# Patient Record
Sex: MALE | Race: WHITE | Employment: FULL TIME | URBAN - NONMETROPOLITAN AREA
[De-identification: names, ages, dates, MRNs, and addresses within clinical notes are randomized per-mention and may not be internally consistent; named-entity substitution may affect disease eponyms.]

---

## 2019-02-08 ENCOUNTER — APPOINTMENT (OUTPATIENT)
Dept: CT IMAGING | Age: 24
End: 2019-02-08
Payer: MEDICAID

## 2019-02-08 ENCOUNTER — ANESTHESIA EVENT (OUTPATIENT)
Dept: OPERATING ROOM | Age: 24
End: 2019-02-08
Payer: MEDICAID

## 2019-02-08 ENCOUNTER — ANESTHESIA (OUTPATIENT)
Dept: OPERATING ROOM | Age: 24
End: 2019-02-08
Payer: MEDICAID

## 2019-02-08 ENCOUNTER — HOSPITAL ENCOUNTER (OUTPATIENT)
Age: 24
Setting detail: OBSERVATION
Discharge: HOME | End: 2019-02-08
Attending: EMERGENCY MEDICINE | Admitting: SURGERY
Payer: MEDICAID

## 2019-02-08 VITALS
RESPIRATION RATE: 5 BRPM | TEMPERATURE: 96.6 F | DIASTOLIC BLOOD PRESSURE: 57 MMHG | SYSTOLIC BLOOD PRESSURE: 102 MMHG | OXYGEN SATURATION: 100 %

## 2019-02-08 VITALS
RESPIRATION RATE: 16 BRPM | HEIGHT: 68 IN | SYSTOLIC BLOOD PRESSURE: 99 MMHG | HEART RATE: 63 BPM | DIASTOLIC BLOOD PRESSURE: 57 MMHG | WEIGHT: 215 LBS | BODY MASS INDEX: 32.58 KG/M2 | OXYGEN SATURATION: 96 % | TEMPERATURE: 97.6 F

## 2019-02-08 DIAGNOSIS — K35.20 ACUTE APPENDICITIS WITH GENERALIZED PERITONITIS, WITHOUT ABSCESS, UNSPECIFIED WHETHER GANGRENE PRESENT, UNSPECIFIED WHETHER PERFORATION PRESENT: Primary | ICD-10-CM

## 2019-02-08 PROBLEM — K37 APPENDICITIS: Status: ACTIVE | Noted: 2019-02-08

## 2019-02-08 PROBLEM — K35.80 ACUTE APPENDICITIS: Status: ACTIVE | Noted: 2019-02-08

## 2019-02-08 LAB
ALBUMIN SERPL-MCNC: 4.7 G/DL (ref 3.5–5.2)
ALP BLD-CCNC: 90 U/L (ref 40–130)
ALT SERPL-CCNC: 22 U/L (ref 5–41)
ANION GAP SERPL CALCULATED.3IONS-SCNC: 12 MMOL/L (ref 7–19)
AST SERPL-CCNC: 22 U/L (ref 5–40)
ATYPICAL LYMPHOCYTE RELATIVE PERCENT: 5 % (ref 0–8)
BASOPHILS ABSOLUTE: 0 K/UL (ref 0–0.2)
BASOPHILS MANUAL: 0 %
BASOPHILS RELATIVE PERCENT: 0 % (ref 0–1)
BILIRUB SERPL-MCNC: 0.4 MG/DL (ref 0.2–1.2)
BILIRUBIN URINE: NEGATIVE
BLOOD, URINE: NEGATIVE
BUN BLDV-MCNC: 10 MG/DL (ref 6–20)
CALCIUM SERPL-MCNC: 9.2 MG/DL (ref 8.6–10)
CHLORIDE BLD-SCNC: 103 MMOL/L (ref 98–111)
CLARITY: ABNORMAL
CO2: 27 MMOL/L (ref 22–29)
COLOR: YELLOW
CREAT SERPL-MCNC: 0.9 MG/DL (ref 0.5–1.2)
EOSINOPHILS ABSOLUTE: 0.18 K/UL (ref 0–0.6)
EOSINOPHILS RELATIVE PERCENT: 1 % (ref 0–5)
GFR NON-AFRICAN AMERICAN: >60
GLUCOSE BLD-MCNC: 92 MG/DL (ref 74–109)
GLUCOSE URINE: NEGATIVE MG/DL
HCT VFR BLD CALC: 48.4 % (ref 42–52)
HEMOGLOBIN: 16.4 G/DL (ref 14–18)
KETONES, URINE: NEGATIVE MG/DL
LEUKOCYTE ESTERASE, URINE: NEGATIVE
LIPASE: 20 U/L (ref 13–60)
LYMPHOCYTES ABSOLUTE: 7.2 K/UL (ref 1.1–4.5)
LYMPHOCYTES RELATIVE PERCENT: 36 % (ref 20–40)
MCH RBC QN AUTO: 29.6 PG (ref 27–31)
MCHC RBC AUTO-ENTMCNC: 33.9 G/DL (ref 33–37)
MCV RBC AUTO: 87.4 FL (ref 80–94)
MONOCYTES ABSOLUTE: 0.9 K/UL (ref 0–0.9)
MONOCYTES RELATIVE PERCENT: 5 % (ref 0–10)
NEUTROPHILS ABSOLUTE: 9.3 K/UL (ref 1.5–7.5)
NEUTROPHILS MANUAL: 53 %
NEUTROPHILS RELATIVE PERCENT: 53 % (ref 50–65)
NITRITE, URINE: NEGATIVE
PDW BLD-RTO: 12.6 % (ref 11.5–14.5)
PH UA: 6.5
PLATELET # BLD: 244 K/UL (ref 130–400)
PLATELET SLIDE REVIEW: ADEQUATE
PMV BLD AUTO: 9.2 FL (ref 9.4–12.4)
POTASSIUM SERPL-SCNC: 3.9 MMOL/L (ref 3.5–5)
PROTEIN UA: NEGATIVE MG/DL
RBC # BLD: 5.54 M/UL (ref 4.7–6.1)
RBC # BLD: NORMAL 10*6/UL
SODIUM BLD-SCNC: 142 MMOL/L (ref 136–145)
SPECIFIC GRAVITY UA: 1.02
TOTAL PROTEIN: 7.3 G/DL (ref 6.6–8.7)
URINE REFLEX TO CULTURE: ABNORMAL
UROBILINOGEN, URINE: 0.2 E.U./DL
WBC # BLD: 17.5 K/UL (ref 4.8–10.8)

## 2019-02-08 PROCEDURE — 2500000003 HC RX 250 WO HCPCS: Performed by: NURSE ANESTHETIST, CERTIFIED REGISTERED

## 2019-02-08 PROCEDURE — 44970 LAPAROSCOPY APPENDECTOMY: CPT | Performed by: SURGERY

## 2019-02-08 PROCEDURE — 96376 TX/PRO/DX INJ SAME DRUG ADON: CPT

## 2019-02-08 PROCEDURE — 3700000001 HC ADD 15 MINUTES (ANESTHESIA): Performed by: SURGERY

## 2019-02-08 PROCEDURE — 6370000000 HC RX 637 (ALT 250 FOR IP): Performed by: SURGERY

## 2019-02-08 PROCEDURE — 6360000002 HC RX W HCPCS: Performed by: NURSE ANESTHETIST, CERTIFIED REGISTERED

## 2019-02-08 PROCEDURE — 74150 CT ABDOMEN W/O CONTRAST: CPT

## 2019-02-08 PROCEDURE — 96375 TX/PRO/DX INJ NEW DRUG ADDON: CPT

## 2019-02-08 PROCEDURE — 2580000003 HC RX 258: Performed by: SURGERY

## 2019-02-08 PROCEDURE — 7100000001 HC PACU RECOVERY - ADDTL 15 MIN: Performed by: SURGERY

## 2019-02-08 PROCEDURE — G0378 HOSPITAL OBSERVATION PER HR: HCPCS

## 2019-02-08 PROCEDURE — 94664 DEMO&/EVAL PT USE INHALER: CPT

## 2019-02-08 PROCEDURE — 6360000002 HC RX W HCPCS: Performed by: EMERGENCY MEDICINE

## 2019-02-08 PROCEDURE — 6370000000 HC RX 637 (ALT 250 FOR IP): Performed by: NURSE ANESTHETIST, CERTIFIED REGISTERED

## 2019-02-08 PROCEDURE — 3700000000 HC ANESTHESIA ATTENDED CARE: Performed by: SURGERY

## 2019-02-08 PROCEDURE — 2720000010 HC SURG SUPPLY STERILE: Performed by: SURGERY

## 2019-02-08 PROCEDURE — 3600000004 HC SURGERY LEVEL 4 BASE: Performed by: SURGERY

## 2019-02-08 PROCEDURE — 7100000000 HC PACU RECOVERY - FIRST 15 MIN: Performed by: SURGERY

## 2019-02-08 PROCEDURE — 80053 COMPREHEN METABOLIC PANEL: CPT

## 2019-02-08 PROCEDURE — 85025 COMPLETE CBC W/AUTO DIFF WBC: CPT

## 2019-02-08 PROCEDURE — 83690 ASSAY OF LIPASE: CPT

## 2019-02-08 PROCEDURE — 81003 URINALYSIS AUTO W/O SCOPE: CPT

## 2019-02-08 PROCEDURE — 2580000003 HC RX 258: Performed by: NURSE ANESTHETIST, CERTIFIED REGISTERED

## 2019-02-08 PROCEDURE — 36415 COLL VENOUS BLD VENIPUNCTURE: CPT

## 2019-02-08 PROCEDURE — 2500000003 HC RX 250 WO HCPCS: Performed by: SURGERY

## 2019-02-08 PROCEDURE — 96374 THER/PROPH/DIAG INJ IV PUSH: CPT

## 2019-02-08 PROCEDURE — 99285 EMERGENCY DEPT VISIT HI MDM: CPT | Performed by: EMERGENCY MEDICINE

## 2019-02-08 PROCEDURE — 2580000003 HC RX 258: Performed by: EMERGENCY MEDICINE

## 2019-02-08 PROCEDURE — 99999 PR OFFICE/OUTPT VISIT,PROCEDURE ONLY: CPT | Performed by: PHYSICIAN ASSISTANT

## 2019-02-08 PROCEDURE — 3600000014 HC SURGERY LEVEL 4 ADDTL 15MIN: Performed by: SURGERY

## 2019-02-08 PROCEDURE — 99219 PR INITIAL OBSERVATION CARE/DAY 50 MINUTES: CPT | Performed by: SURGERY

## 2019-02-08 PROCEDURE — 99285 EMERGENCY DEPT VISIT HI MDM: CPT

## 2019-02-08 PROCEDURE — 88304 TISSUE EXAM BY PATHOLOGIST: CPT

## 2019-02-08 PROCEDURE — 2709999900 HC NON-CHARGEABLE SUPPLY: Performed by: SURGERY

## 2019-02-08 PROCEDURE — 6360000002 HC RX W HCPCS: Performed by: SURGERY

## 2019-02-08 RX ORDER — ONDANSETRON 2 MG/ML
INJECTION INTRAMUSCULAR; INTRAVENOUS PRN
Status: DISCONTINUED | OUTPATIENT
Start: 2019-02-08 | End: 2019-02-08 | Stop reason: SDUPTHER

## 2019-02-08 RX ORDER — 0.9 % SODIUM CHLORIDE 0.9 %
1000 INTRAVENOUS SOLUTION INTRAVENOUS ONCE
Status: COMPLETED | OUTPATIENT
Start: 2019-02-08 | End: 2019-02-08

## 2019-02-08 RX ORDER — HYDROCODONE BITARTRATE AND ACETAMINOPHEN 5; 325 MG/1; MG/1
1 TABLET ORAL EVERY 4 HOURS PRN
Status: DISCONTINUED | OUTPATIENT
Start: 2019-02-08 | End: 2019-02-08 | Stop reason: HOSPADM

## 2019-02-08 RX ORDER — LABETALOL HYDROCHLORIDE 5 MG/ML
5 INJECTION, SOLUTION INTRAVENOUS EVERY 10 MIN PRN
Status: DISCONTINUED | OUTPATIENT
Start: 2019-02-08 | End: 2019-02-08 | Stop reason: HOSPADM

## 2019-02-08 RX ORDER — PROPOFOL 10 MG/ML
INJECTION, EMULSION INTRAVENOUS PRN
Status: DISCONTINUED | OUTPATIENT
Start: 2019-02-08 | End: 2019-02-08 | Stop reason: SDUPTHER

## 2019-02-08 RX ORDER — SCOLOPAMINE TRANSDERMAL SYSTEM 1 MG/1
PATCH, EXTENDED RELEASE TRANSDERMAL PRN
Status: DISCONTINUED | OUTPATIENT
Start: 2019-02-08 | End: 2019-02-08 | Stop reason: SDUPTHER

## 2019-02-08 RX ORDER — SCOLOPAMINE TRANSDERMAL SYSTEM 1 MG/1
1 PATCH, EXTENDED RELEASE TRANSDERMAL ONCE
Status: DISCONTINUED | OUTPATIENT
Start: 2019-02-08 | End: 2019-02-08 | Stop reason: HOSPADM

## 2019-02-08 RX ORDER — SODIUM CHLORIDE, SODIUM LACTATE, POTASSIUM CHLORIDE, CALCIUM CHLORIDE 600; 310; 30; 20 MG/100ML; MG/100ML; MG/100ML; MG/100ML
INJECTION, SOLUTION INTRAVENOUS CONTINUOUS
Status: DISCONTINUED | OUTPATIENT
Start: 2019-02-08 | End: 2019-02-08

## 2019-02-08 RX ORDER — METOCLOPRAMIDE HYDROCHLORIDE 5 MG/ML
10 INJECTION INTRAMUSCULAR; INTRAVENOUS
Status: DISCONTINUED | OUTPATIENT
Start: 2019-02-08 | End: 2019-02-08 | Stop reason: HOSPADM

## 2019-02-08 RX ORDER — FENTANYL CITRATE 50 UG/ML
INJECTION, SOLUTION INTRAMUSCULAR; INTRAVENOUS PRN
Status: DISCONTINUED | OUTPATIENT
Start: 2019-02-08 | End: 2019-02-08 | Stop reason: SDUPTHER

## 2019-02-08 RX ORDER — KETOROLAC TROMETHAMINE 30 MG/ML
INJECTION, SOLUTION INTRAMUSCULAR; INTRAVENOUS PRN
Status: DISCONTINUED | OUTPATIENT
Start: 2019-02-08 | End: 2019-02-08 | Stop reason: SDUPTHER

## 2019-02-08 RX ORDER — ONDANSETRON 2 MG/ML
4 INJECTION INTRAMUSCULAR; INTRAVENOUS ONCE
Status: COMPLETED | OUTPATIENT
Start: 2019-02-08 | End: 2019-02-08

## 2019-02-08 RX ORDER — SODIUM CHLORIDE 0.9 % (FLUSH) 0.9 %
10 SYRINGE (ML) INJECTION PRN
Status: DISCONTINUED | OUTPATIENT
Start: 2019-02-08 | End: 2019-02-08 | Stop reason: HOSPADM

## 2019-02-08 RX ORDER — SODIUM CHLORIDE 9 MG/ML
INJECTION, SOLUTION INTRAVENOUS CONTINUOUS
Status: DISCONTINUED | OUTPATIENT
Start: 2019-02-08 | End: 2019-02-08 | Stop reason: HOSPADM

## 2019-02-08 RX ORDER — FLUOXETINE HYDROCHLORIDE 20 MG/1
20 CAPSULE ORAL DAILY
Status: DISCONTINUED | OUTPATIENT
Start: 2019-02-08 | End: 2019-02-08 | Stop reason: HOSPADM

## 2019-02-08 RX ORDER — ACETAMINOPHEN 325 MG/1
325 TABLET ORAL EVERY 4 HOURS PRN
Status: DISCONTINUED | OUTPATIENT
Start: 2019-02-08 | End: 2019-02-08 | Stop reason: HOSPADM

## 2019-02-08 RX ORDER — HYDRALAZINE HYDROCHLORIDE 20 MG/ML
5 INJECTION INTRAMUSCULAR; INTRAVENOUS EVERY 10 MIN PRN
Status: DISCONTINUED | OUTPATIENT
Start: 2019-02-08 | End: 2019-02-08 | Stop reason: HOSPADM

## 2019-02-08 RX ORDER — SODIUM CHLORIDE 0.9 % (FLUSH) 0.9 %
10 SYRINGE (ML) INJECTION EVERY 12 HOURS SCHEDULED
Status: DISCONTINUED | OUTPATIENT
Start: 2019-02-08 | End: 2019-02-08 | Stop reason: HOSPADM

## 2019-02-08 RX ORDER — LIDOCAINE HYDROCHLORIDE 10 MG/ML
INJECTION, SOLUTION INFILTRATION; PERINEURAL PRN
Status: DISCONTINUED | OUTPATIENT
Start: 2019-02-08 | End: 2019-02-08 | Stop reason: SDUPTHER

## 2019-02-08 RX ORDER — DIPHENHYDRAMINE HYDROCHLORIDE 50 MG/ML
12.5 INJECTION INTRAMUSCULAR; INTRAVENOUS
Status: DISCONTINUED | OUTPATIENT
Start: 2019-02-08 | End: 2019-02-08 | Stop reason: HOSPADM

## 2019-02-08 RX ORDER — FENTANYL CITRATE 50 UG/ML
50 INJECTION, SOLUTION INTRAMUSCULAR; INTRAVENOUS
Status: DISCONTINUED | OUTPATIENT
Start: 2019-02-08 | End: 2019-02-08 | Stop reason: HOSPADM

## 2019-02-08 RX ORDER — MORPHINE SULFATE 4 MG/ML
4 INJECTION, SOLUTION INTRAMUSCULAR; INTRAVENOUS
Status: DISCONTINUED | OUTPATIENT
Start: 2019-02-08 | End: 2019-02-08 | Stop reason: HOSPADM

## 2019-02-08 RX ORDER — PROMETHAZINE HYDROCHLORIDE 25 MG/ML
6.25 INJECTION, SOLUTION INTRAMUSCULAR; INTRAVENOUS
Status: DISCONTINUED | OUTPATIENT
Start: 2019-02-08 | End: 2019-02-08 | Stop reason: HOSPADM

## 2019-02-08 RX ORDER — MIDAZOLAM HYDROCHLORIDE 1 MG/ML
2 INJECTION INTRAMUSCULAR; INTRAVENOUS
Status: DISCONTINUED | OUTPATIENT
Start: 2019-02-08 | End: 2019-02-08 | Stop reason: HOSPADM

## 2019-02-08 RX ORDER — MIDAZOLAM HYDROCHLORIDE 1 MG/ML
INJECTION INTRAMUSCULAR; INTRAVENOUS PRN
Status: DISCONTINUED | OUTPATIENT
Start: 2019-02-08 | End: 2019-02-08 | Stop reason: SDUPTHER

## 2019-02-08 RX ORDER — LIDOCAINE HYDROCHLORIDE 10 MG/ML
1 INJECTION, SOLUTION EPIDURAL; INFILTRATION; INTRACAUDAL; PERINEURAL
Status: DISCONTINUED | OUTPATIENT
Start: 2019-02-08 | End: 2019-02-08 | Stop reason: HOSPADM

## 2019-02-08 RX ORDER — ONDANSETRON 2 MG/ML
4 INJECTION INTRAMUSCULAR; INTRAVENOUS EVERY 4 HOURS PRN
Status: DISCONTINUED | OUTPATIENT
Start: 2019-02-08 | End: 2019-02-08 | Stop reason: HOSPADM

## 2019-02-08 RX ORDER — MORPHINE SULFATE 2 MG/ML
2 INJECTION, SOLUTION INTRAMUSCULAR; INTRAVENOUS
Status: DISCONTINUED | OUTPATIENT
Start: 2019-02-08 | End: 2019-02-08 | Stop reason: HOSPADM

## 2019-02-08 RX ORDER — DEXAMETHASONE SODIUM PHOSPHATE 10 MG/ML
INJECTION INTRAMUSCULAR; INTRAVENOUS PRN
Status: DISCONTINUED | OUTPATIENT
Start: 2019-02-08 | End: 2019-02-08 | Stop reason: SDUPTHER

## 2019-02-08 RX ORDER — GLYCOPYRROLATE 0.2 MG/ML
INJECTION INTRAMUSCULAR; INTRAVENOUS PRN
Status: DISCONTINUED | OUTPATIENT
Start: 2019-02-08 | End: 2019-02-08 | Stop reason: SDUPTHER

## 2019-02-08 RX ORDER — ONDANSETRON 2 MG/ML
4 INJECTION INTRAMUSCULAR; INTRAVENOUS EVERY 8 HOURS PRN
Status: DISCONTINUED | OUTPATIENT
Start: 2019-02-08 | End: 2019-02-08 | Stop reason: HOSPADM

## 2019-02-08 RX ORDER — HYDROCODONE BITARTRATE AND ACETAMINOPHEN 5; 325 MG/1; MG/1
1 TABLET ORAL EVERY 4 HOURS PRN
Qty: 20 TABLET | Refills: 0 | Status: SHIPPED | OUTPATIENT
Start: 2019-02-08 | End: 2019-02-12 | Stop reason: SDUPTHER

## 2019-02-08 RX ORDER — FLUOXETINE HYDROCHLORIDE 20 MG/1
20 CAPSULE ORAL DAILY
COMMUNITY
End: 2019-09-09

## 2019-02-08 RX ORDER — HYDROCODONE BITARTRATE AND ACETAMINOPHEN 5; 325 MG/1; MG/1
2 TABLET ORAL EVERY 4 HOURS PRN
Status: DISCONTINUED | OUTPATIENT
Start: 2019-02-08 | End: 2019-02-08 | Stop reason: HOSPADM

## 2019-02-08 RX ORDER — MORPHINE SULFATE 2 MG/ML
4 INJECTION, SOLUTION INTRAMUSCULAR; INTRAVENOUS ONCE
Status: COMPLETED | OUTPATIENT
Start: 2019-02-08 | End: 2019-02-08

## 2019-02-08 RX ORDER — MORPHINE SULFATE 2 MG/ML
4 INJECTION, SOLUTION INTRAMUSCULAR; INTRAVENOUS
Status: DISCONTINUED | OUTPATIENT
Start: 2019-02-08 | End: 2019-02-08

## 2019-02-08 RX ORDER — ACETAMINOPHEN 325 MG/1
650 TABLET ORAL EVERY 4 HOURS PRN
Status: DISCONTINUED | OUTPATIENT
Start: 2019-02-08 | End: 2019-02-08

## 2019-02-08 RX ORDER — MORPHINE SULFATE 2 MG/ML
2 INJECTION, SOLUTION INTRAMUSCULAR; INTRAVENOUS EVERY 5 MIN PRN
Status: DISCONTINUED | OUTPATIENT
Start: 2019-02-08 | End: 2019-02-08 | Stop reason: HOSPADM

## 2019-02-08 RX ORDER — BUPIVACAINE HYDROCHLORIDE 2.5 MG/ML
INJECTION, SOLUTION INFILTRATION; PERINEURAL PRN
Status: DISCONTINUED | OUTPATIENT
Start: 2019-02-08 | End: 2019-02-08 | Stop reason: HOSPADM

## 2019-02-08 RX ORDER — KETOROLAC TROMETHAMINE 30 MG/ML
15 INJECTION, SOLUTION INTRAMUSCULAR; INTRAVENOUS EVERY 6 HOURS
Status: DISCONTINUED | OUTPATIENT
Start: 2019-02-08 | End: 2019-02-08 | Stop reason: HOSPADM

## 2019-02-08 RX ORDER — MORPHINE SULFATE 2 MG/ML
4 INJECTION, SOLUTION INTRAMUSCULAR; INTRAVENOUS EVERY 5 MIN PRN
Status: DISCONTINUED | OUTPATIENT
Start: 2019-02-08 | End: 2019-02-08 | Stop reason: HOSPADM

## 2019-02-08 RX ORDER — ROCURONIUM BROMIDE 10 MG/ML
INJECTION, SOLUTION INTRAVENOUS PRN
Status: DISCONTINUED | OUTPATIENT
Start: 2019-02-08 | End: 2019-02-08 | Stop reason: SDUPTHER

## 2019-02-08 RX ORDER — MEPERIDINE HYDROCHLORIDE 50 MG/ML
12.5 INJECTION INTRAMUSCULAR; INTRAVENOUS; SUBCUTANEOUS EVERY 5 MIN PRN
Status: DISCONTINUED | OUTPATIENT
Start: 2019-02-08 | End: 2019-02-08 | Stop reason: HOSPADM

## 2019-02-08 RX ORDER — SODIUM CHLORIDE, SODIUM LACTATE, POTASSIUM CHLORIDE, CALCIUM CHLORIDE 600; 310; 30; 20 MG/100ML; MG/100ML; MG/100ML; MG/100ML
INJECTION, SOLUTION INTRAVENOUS CONTINUOUS PRN
Status: DISCONTINUED | OUTPATIENT
Start: 2019-02-08 | End: 2019-02-08 | Stop reason: SDUPTHER

## 2019-02-08 RX ORDER — EPHEDRINE SULFATE 50 MG/ML
INJECTION, SOLUTION INTRAVENOUS PRN
Status: DISCONTINUED | OUTPATIENT
Start: 2019-02-08 | End: 2019-02-08 | Stop reason: SDUPTHER

## 2019-02-08 RX ADMIN — SODIUM CHLORIDE 1000 ML: 9 INJECTION, SOLUTION INTRAVENOUS at 02:52

## 2019-02-08 RX ADMIN — FENTANYL CITRATE 100 MCG: 50 INJECTION INTRAMUSCULAR; INTRAVENOUS at 09:56

## 2019-02-08 RX ADMIN — SODIUM CHLORIDE, SODIUM LACTATE, POTASSIUM CHLORIDE, AND CALCIUM CHLORIDE: 600; 310; 30; 20 INJECTION, SOLUTION INTRAVENOUS at 09:45

## 2019-02-08 RX ADMIN — HYDROCODONE BITARTRATE AND ACETAMINOPHEN 2 TABLET: 5; 325 TABLET ORAL at 12:16

## 2019-02-08 RX ADMIN — SUGAMMADEX 200 MG: 100 INJECTION, SOLUTION INTRAVENOUS at 10:38

## 2019-02-08 RX ADMIN — SODIUM CHLORIDE, SODIUM LACTATE, POTASSIUM CHLORIDE, AND CALCIUM CHLORIDE: 600; 310; 30; 20 INJECTION, SOLUTION INTRAVENOUS at 09:52

## 2019-02-08 RX ADMIN — DEXAMETHASONE SODIUM PHOSPHATE 10 MG: 10 INJECTION INTRAMUSCULAR; INTRAVENOUS at 10:02

## 2019-02-08 RX ADMIN — ROCURONIUM BROMIDE 50 MG: 10 INJECTION INTRAVENOUS at 09:56

## 2019-02-08 RX ADMIN — HYDROMORPHONE HYDROCHLORIDE 0.25 MG: 1 INJECTION, SOLUTION INTRAMUSCULAR; INTRAVENOUS; SUBCUTANEOUS at 10:47

## 2019-02-08 RX ADMIN — EPHEDRINE SULFATE 10 MG: 50 INJECTION, SOLUTION INTRAMUSCULAR; INTRAVENOUS; SUBCUTANEOUS at 10:17

## 2019-02-08 RX ADMIN — ONDANSETRON HYDROCHLORIDE 4 MG: 2 INJECTION, SOLUTION INTRAMUSCULAR; INTRAVENOUS at 10:26

## 2019-02-08 RX ADMIN — LIDOCAINE HYDROCHLORIDE 50 MG: 10 INJECTION, SOLUTION INFILTRATION; PERINEURAL at 09:56

## 2019-02-08 RX ADMIN — MORPHINE SULFATE 4 MG: 2 INJECTION, SOLUTION INTRAMUSCULAR; INTRAVENOUS at 04:15

## 2019-02-08 RX ADMIN — HYDROMORPHONE HYDROCHLORIDE 0.25 MG: 1 INJECTION, SOLUTION INTRAMUSCULAR; INTRAVENOUS; SUBCUTANEOUS at 10:41

## 2019-02-08 RX ADMIN — PROPOFOL 200 MG: 10 INJECTION, EMULSION INTRAVENOUS at 09:56

## 2019-02-08 RX ADMIN — GLYCOPYRROLATE 0.2 MG: 0.2 INJECTION INTRAMUSCULAR; INTRAVENOUS at 10:22

## 2019-02-08 RX ADMIN — SODIUM CHLORIDE: 9 INJECTION, SOLUTION INTRAVENOUS at 04:50

## 2019-02-08 RX ADMIN — MORPHINE SULFATE 4 MG: 2 INJECTION, SOLUTION INTRAMUSCULAR; INTRAVENOUS at 06:24

## 2019-02-08 RX ADMIN — ONDANSETRON 4 MG: 2 INJECTION, SOLUTION INTRAMUSCULAR; INTRAVENOUS at 02:52

## 2019-02-08 RX ADMIN — CEFOXITIN SODIUM 2 G: 1 POWDER, FOR SOLUTION INTRAVENOUS at 05:33

## 2019-02-08 RX ADMIN — PHENYLEPHRINE HYDROCHLORIDE 100 MCG: 10 INJECTION INTRAVENOUS at 10:22

## 2019-02-08 RX ADMIN — MIDAZOLAM 2 MG: 1 INJECTION INTRAMUSCULAR; INTRAVENOUS at 09:48

## 2019-02-08 RX ADMIN — CEFOXITIN SODIUM 2 G: 1 POWDER, FOR SOLUTION INTRAVENOUS at 12:33

## 2019-02-08 RX ADMIN — KETOROLAC TROMETHAMINE 30 MG: 30 INJECTION, SOLUTION INTRAMUSCULAR; INTRAVENOUS at 10:26

## 2019-02-08 RX ADMIN — MORPHINE SULFATE 4 MG: 2 INJECTION, SOLUTION INTRAMUSCULAR; INTRAVENOUS at 03:13

## 2019-02-08 RX ADMIN — SCOPALAMINE 1 PATCH: 1 PATCH, EXTENDED RELEASE TRANSDERMAL at 09:48

## 2019-02-08 ASSESSMENT — PAIN SCALES - GENERAL
PAINLEVEL_OUTOF10: 6
PAINLEVEL_OUTOF10: 6
PAINLEVEL_OUTOF10: 7
PAINLEVEL_OUTOF10: 3
PAINLEVEL_OUTOF10: 7
PAINLEVEL_OUTOF10: 3

## 2019-02-08 ASSESSMENT — ENCOUNTER SYMPTOMS
SHORTNESS OF BREATH: 0
VOMITING: 1
TROUBLE SWALLOWING: 0
NAUSEA: 1
RHINORRHEA: 0
COUGH: 0
CHEST TIGHTNESS: 0
DIARRHEA: 0
BACK PAIN: 1
COLOR CHANGE: 0
SORE THROAT: 0
SINUS PRESSURE: 0
BACK PAIN: 0
CONSTIPATION: 0
ABDOMINAL DISTENTION: 0
ABDOMINAL PAIN: 1
WHEEZING: 0

## 2019-02-08 ASSESSMENT — PAIN DESCRIPTION - DESCRIPTORS: DESCRIPTORS: PRESSURE

## 2019-02-08 ASSESSMENT — PAIN DESCRIPTION - ORIENTATION: ORIENTATION: RIGHT;MID;LOWER

## 2019-02-08 ASSESSMENT — PAIN DESCRIPTION - LOCATION: LOCATION: ABDOMEN

## 2019-02-08 ASSESSMENT — LIFESTYLE VARIABLES: SMOKING_STATUS: 1

## 2019-02-12 ENCOUNTER — OFFICE VISIT (OUTPATIENT)
Dept: SURGERY | Age: 24
End: 2019-02-12

## 2019-02-12 ENCOUNTER — TELEPHONE (OUTPATIENT)
Dept: SURGERY | Age: 24
End: 2019-02-12

## 2019-02-12 VITALS
OXYGEN SATURATION: 98 % | TEMPERATURE: 97.8 F | HEART RATE: 89 BPM | BODY MASS INDEX: 32.95 KG/M2 | HEIGHT: 68 IN | WEIGHT: 217.4 LBS

## 2019-02-12 DIAGNOSIS — K35.20 ACUTE APPENDICITIS WITH GENERALIZED PERITONITIS, WITHOUT ABSCESS, UNSPECIFIED WHETHER GANGRENE PRESENT, UNSPECIFIED WHETHER PERFORATION PRESENT: ICD-10-CM

## 2019-02-12 PROCEDURE — 99024 POSTOP FOLLOW-UP VISIT: CPT | Performed by: PHYSICIAN ASSISTANT

## 2019-02-12 RX ORDER — HYDROCODONE BITARTRATE AND ACETAMINOPHEN 5; 325 MG/1; MG/1
1 TABLET ORAL EVERY 6 HOURS PRN
Qty: 20 TABLET | Refills: 0 | Status: SHIPPED | OUTPATIENT
Start: 2019-02-12 | End: 2019-02-19

## 2019-02-22 ENCOUNTER — OFFICE VISIT (OUTPATIENT)
Dept: SURGERY | Age: 24
End: 2019-02-22

## 2019-02-22 VITALS — HEART RATE: 80 BPM | DIASTOLIC BLOOD PRESSURE: 70 MMHG | SYSTOLIC BLOOD PRESSURE: 134 MMHG

## 2019-02-22 DIAGNOSIS — K35.20 ACUTE APPENDICITIS WITH GENERALIZED PERITONITIS, WITHOUT ABSCESS, UNSPECIFIED WHETHER GANGRENE PRESENT, UNSPECIFIED WHETHER PERFORATION PRESENT: Primary | ICD-10-CM

## 2019-02-22 PROCEDURE — 99024 POSTOP FOLLOW-UP VISIT: CPT | Performed by: SURGERY

## 2019-09-09 ENCOUNTER — APPOINTMENT (OUTPATIENT)
Dept: CT IMAGING | Age: 24
End: 2019-09-09
Payer: MEDICAID

## 2019-09-09 ENCOUNTER — HOSPITAL ENCOUNTER (EMERGENCY)
Age: 24
Discharge: HOME | End: 2019-09-09
Payer: MEDICAID

## 2019-09-09 VITALS
BODY MASS INDEX: 32.58 KG/M2 | HEART RATE: 95 BPM | HEIGHT: 68 IN | RESPIRATION RATE: 17 BRPM | SYSTOLIC BLOOD PRESSURE: 113 MMHG | TEMPERATURE: 97.6 F | DIASTOLIC BLOOD PRESSURE: 67 MMHG | OXYGEN SATURATION: 95 % | WEIGHT: 215 LBS

## 2019-09-09 DIAGNOSIS — R19.7 NAUSEA VOMITING AND DIARRHEA: ICD-10-CM

## 2019-09-09 DIAGNOSIS — R11.2 NAUSEA VOMITING AND DIARRHEA: ICD-10-CM

## 2019-09-09 DIAGNOSIS — K52.9 ILEOCOLITIS: Primary | ICD-10-CM

## 2019-09-09 DIAGNOSIS — R10.13 ABDOMINAL PAIN, EPIGASTRIC: ICD-10-CM

## 2019-09-09 LAB
ALBUMIN SERPL-MCNC: 4.7 G/DL (ref 3.5–5.2)
ALP BLD-CCNC: 94 U/L (ref 40–130)
ALT SERPL-CCNC: 28 U/L (ref 5–41)
AMYLASE: 67 U/L (ref 28–100)
ANION GAP SERPL CALCULATED.3IONS-SCNC: 11 MMOL/L (ref 7–19)
AST SERPL-CCNC: 20 U/L (ref 5–40)
BASOPHILS ABSOLUTE: 0.1 K/UL (ref 0–0.2)
BASOPHILS RELATIVE PERCENT: 0.5 % (ref 0–1)
BILIRUB SERPL-MCNC: 0.3 MG/DL (ref 0.2–1.2)
BILIRUBIN URINE: NEGATIVE
BLOOD, URINE: NEGATIVE
BUN BLDV-MCNC: 10 MG/DL (ref 6–20)
CALCIUM SERPL-MCNC: 9.4 MG/DL (ref 8.6–10)
CHLORIDE BLD-SCNC: 103 MMOL/L (ref 98–111)
CLARITY: CLEAR
CO2: 29 MMOL/L (ref 22–29)
COLOR: YELLOW
CREAT SERPL-MCNC: 0.9 MG/DL (ref 0.5–1.2)
EOSINOPHILS ABSOLUTE: 0.5 K/UL (ref 0–0.6)
EOSINOPHILS RELATIVE PERCENT: 4.9 % (ref 0–5)
GFR NON-AFRICAN AMERICAN: >60
GLUCOSE BLD-MCNC: 106 MG/DL (ref 74–109)
GLUCOSE URINE: NEGATIVE MG/DL
HCT VFR BLD CALC: 52.9 % (ref 42–52)
HEMOGLOBIN: 17.5 G/DL (ref 14–18)
IMMATURE GRANULOCYTES #: 0 K/UL
KETONES, URINE: NEGATIVE MG/DL
LEUKOCYTE ESTERASE, URINE: NEGATIVE
LIPASE: 89 U/L (ref 13–60)
LYMPHOCYTES ABSOLUTE: 4.1 K/UL (ref 1.1–4.5)
LYMPHOCYTES RELATIVE PERCENT: 42.2 % (ref 20–40)
MCH RBC QN AUTO: 29.5 PG (ref 27–31)
MCHC RBC AUTO-ENTMCNC: 33.1 G/DL (ref 33–37)
MCV RBC AUTO: 89.2 FL (ref 80–94)
MONOCYTES ABSOLUTE: 0.7 K/UL (ref 0–0.9)
MONOCYTES RELATIVE PERCENT: 7.1 % (ref 0–10)
NEUTROPHILS ABSOLUTE: 4.3 K/UL (ref 1.5–7.5)
NEUTROPHILS RELATIVE PERCENT: 44.9 % (ref 50–65)
NITRITE, URINE: NEGATIVE
PDW BLD-RTO: 12.7 % (ref 11.5–14.5)
PH UA: 7 (ref 5–8)
PLATELET # BLD: 258 K/UL (ref 130–400)
PMV BLD AUTO: 9.4 FL (ref 9.4–12.4)
POTASSIUM SERPL-SCNC: 4.3 MMOL/L (ref 3.5–5)
PROTEIN UA: NEGATIVE MG/DL
RBC # BLD: 5.93 M/UL (ref 4.7–6.1)
SODIUM BLD-SCNC: 143 MMOL/L (ref 136–145)
SPECIFIC GRAVITY UA: 1.02 (ref 1–1.03)
TOTAL PROTEIN: 7.5 G/DL (ref 6.6–8.7)
URINE REFLEX TO CULTURE: NORMAL
UROBILINOGEN, URINE: 1 E.U./DL
WBC # BLD: 9.6 K/UL (ref 4.8–10.8)

## 2019-09-09 PROCEDURE — 6360000004 HC RX CONTRAST MEDICATION: Performed by: PHYSICIAN ASSISTANT

## 2019-09-09 PROCEDURE — 96374 THER/PROPH/DIAG INJ IV PUSH: CPT

## 2019-09-09 PROCEDURE — 99284 EMERGENCY DEPT VISIT MOD MDM: CPT

## 2019-09-09 PROCEDURE — 85025 COMPLETE CBC W/AUTO DIFF WBC: CPT

## 2019-09-09 PROCEDURE — 6360000002 HC RX W HCPCS: Performed by: PHYSICIAN ASSISTANT

## 2019-09-09 PROCEDURE — 36415 COLL VENOUS BLD VENIPUNCTURE: CPT

## 2019-09-09 PROCEDURE — 83690 ASSAY OF LIPASE: CPT

## 2019-09-09 PROCEDURE — 80053 COMPREHEN METABOLIC PANEL: CPT

## 2019-09-09 PROCEDURE — 2580000003 HC RX 258: Performed by: PHYSICIAN ASSISTANT

## 2019-09-09 PROCEDURE — 82150 ASSAY OF AMYLASE: CPT

## 2019-09-09 PROCEDURE — 74177 CT ABD & PELVIS W/CONTRAST: CPT

## 2019-09-09 PROCEDURE — 81003 URINALYSIS AUTO W/O SCOPE: CPT

## 2019-09-09 RX ORDER — ONDANSETRON 4 MG/1
4 TABLET, ORALLY DISINTEGRATING ORAL EVERY 8 HOURS PRN
Qty: 20 TABLET | Refills: 0 | Status: SHIPPED | OUTPATIENT
Start: 2019-09-09 | End: 2021-12-11

## 2019-09-09 RX ORDER — 0.9 % SODIUM CHLORIDE 0.9 %
1000 INTRAVENOUS SOLUTION INTRAVENOUS ONCE
Status: COMPLETED | OUTPATIENT
Start: 2019-09-09 | End: 2019-09-09

## 2019-09-09 RX ORDER — METRONIDAZOLE 500 MG/1
500 TABLET ORAL 3 TIMES DAILY
Qty: 30 TABLET | Refills: 0 | Status: SHIPPED | OUTPATIENT
Start: 2019-09-09 | End: 2019-09-19

## 2019-09-09 RX ORDER — ONDANSETRON 2 MG/ML
4 INJECTION INTRAMUSCULAR; INTRAVENOUS ONCE
Status: COMPLETED | OUTPATIENT
Start: 2019-09-09 | End: 2019-09-09

## 2019-09-09 RX ORDER — CIPROFLOXACIN 500 MG/1
500 TABLET, FILM COATED ORAL 2 TIMES DAILY
Qty: 20 TABLET | Refills: 0 | Status: SHIPPED | OUTPATIENT
Start: 2019-09-09 | End: 2019-09-19

## 2019-09-09 RX ADMIN — ONDANSETRON 4 MG: 2 INJECTION INTRAMUSCULAR; INTRAVENOUS at 10:06

## 2019-09-09 RX ADMIN — SODIUM CHLORIDE 1000 ML: 9 INJECTION, SOLUTION INTRAVENOUS at 10:06

## 2019-09-09 RX ADMIN — IOPAMIDOL 90 ML: 755 INJECTION, SOLUTION INTRAVENOUS at 11:18

## 2019-09-09 ASSESSMENT — ENCOUNTER SYMPTOMS
APNEA: 0
PHOTOPHOBIA: 0
EYE ITCHING: 0
COUGH: 0
EYE DISCHARGE: 0
ABDOMINAL PAIN: 1
NAUSEA: 1
SHORTNESS OF BREATH: 0
VOMITING: 0
BACK PAIN: 0
COLOR CHANGE: 0

## 2019-09-09 ASSESSMENT — PAIN DESCRIPTION - FREQUENCY: FREQUENCY: CONTINUOUS

## 2019-09-09 ASSESSMENT — PAIN SCALES - GENERAL: PAINLEVEL_OUTOF10: 7

## 2019-09-09 ASSESSMENT — PAIN DESCRIPTION - ORIENTATION: ORIENTATION: UPPER;MID

## 2019-09-09 ASSESSMENT — PAIN DESCRIPTION - DESCRIPTORS: DESCRIPTORS: SQUEEZING;SHARP

## 2019-09-09 ASSESSMENT — PAIN DESCRIPTION - LOCATION: LOCATION: ABDOMEN

## 2019-09-09 NOTE — ED PROVIDER NOTES
HISTORY       Past Surgical History:   Procedure Laterality Date    ANKLE SURGERY Left     LAPAROSCOPIC APPENDECTOMY N/A 2/8/2019    APPENDECTOMY LAPAROSCOPIC performed by Alphonse Griffith MD at [address] [facility]       Discharge Medication List as of 9/9/2019 12:07 PM          ALLERGIES     Patient has no known allergies. FAMILY HISTORY     History reviewed. No pertinent family history.        SOCIAL HISTORY       Social History     Socioeconomic History    Marital status:      Spouse name: None    Number of children: None    Years of education: None    Highest education level: None   Occupational History    None   Social Needs    Financial resource strain: None    Food insecurity:     Worry: None     Inability: None    Transportation needs:     Medical: None     Non-medical: None   Tobacco Use    Smoking status: Current Every Day Smoker     Packs/day: 0.50     Types: Cigarettes    Smokeless tobacco: Never Used   Substance and Sexual Activity    Alcohol use: No    Drug use: No    Sexual activity: None   Lifestyle    Physical activity:     Days per week: None     Minutes per session: None    Stress: None   Relationships    Social connections:     Talks on phone: None     Gets together: None     Attends Orthodoxy service: None     Active member of club or organization: None     Attends meetings of clubs or organizations: None     Relationship status: None    Intimate partner violence:     Fear of current or ex partner: None     Emotionally abused: None     Physically abused: None     Forced sexual activity: None   Other Topics Concern    None   Social History Narrative    None       SCREENINGS    [place] Coma Scale  Eye Opening: Spontaneous  Best Verbal Response: Oriented  Best Motor Response: Obeys commands  [place] Coma Scale Score: 15      PHYSICAL EXAM    (up to 7 forlevel 4, 8 or more for level 5)     ED Triage Vitals [09/09/19 0926]   BP Temp Temp Source Pulse Resp SpO2 Height Weight   121/85 97.6 °F (36.4 °C) Temporal 62 16 98 % 5' 8\" (1.727 m) 215 lb (97.5 kg)       Physical Exam   Constitutional: He is oriented to person, place, and time. He appears well-developed and well-nourished. No distress. HENT:   Head: Normocephalic and atraumatic. Right Ear: External ear normal.   Left Ear: External ear normal.   Nose: Nose normal.   Mouth/Throat: Oropharynx is clear and moist.   Eyes: Pupils are equal, round, and reactive to light. Conjunctivae and EOM are normal.   Neck: Normal range of motion. Neck supple. No tracheal deviation present. Cardiovascular: Normal rate, regular rhythm, normal heart sounds and intact distal pulses. No murmur heard. Pulmonary/Chest: Effort normal and breath sounds normal. No stridor. He has no wheezes. He exhibits no tenderness. Abdominal: Soft. Bowel sounds are normal. He exhibits no distension and no mass. There is tenderness. There is no rebound and no guarding. No hernia. Musculoskeletal: Normal range of motion. Neurological: He is alert and oriented to person, place, and time. He displays normal reflexes. No cranial nerve deficit or sensory deficit. He exhibits normal muscle tone. Coordination normal.   Skin: Skin is warm and dry. Capillary refill takes less than 2 seconds. He is not diaphoretic. Psychiatric: He has a normal mood and affect. His behavior is normal.   Nursing note and vitals reviewed. DIAGNOSTIC RESULTS     RADIOLOGY:   Non-plain film images such as CT, Ultrasound and MRI are read by the radiologist. Plain radiographic images are visualized and preliminarilyinterpreted by No att. providers found with the below findings:      Interpretation per the Radiologist below, if available at the time of this note:    CT ABDOMEN PELVIS W IV CONTRAST Additional Contrast? None   Final Result   Acute ileocolitis.    No evidence of renal arteries ureteral calculi or obstructive

## 2019-09-13 ENCOUNTER — HOSPITAL ENCOUNTER (EMERGENCY)
Age: 24
Discharge: HOME | End: 2019-09-13
Attending: EMERGENCY MEDICINE
Payer: MEDICAID

## 2019-09-13 VITALS
WEIGHT: 215 LBS | OXYGEN SATURATION: 94 % | BODY MASS INDEX: 32.58 KG/M2 | TEMPERATURE: 98.1 F | RESPIRATION RATE: 16 BRPM | SYSTOLIC BLOOD PRESSURE: 109 MMHG | HEIGHT: 68 IN | HEART RATE: 72 BPM | DIASTOLIC BLOOD PRESSURE: 70 MMHG

## 2019-09-13 DIAGNOSIS — K52.9 ILEOCOLITIS: Primary | ICD-10-CM

## 2019-09-13 DIAGNOSIS — R19.7 DIARRHEA OF PRESUMED INFECTIOUS ORIGIN: ICD-10-CM

## 2019-09-13 LAB
ALBUMIN SERPL-MCNC: 4 G/DL (ref 3.5–5.2)
ALP BLD-CCNC: 72 U/L (ref 40–130)
ALT SERPL-CCNC: 24 U/L (ref 5–41)
ANION GAP SERPL CALCULATED.3IONS-SCNC: 9 MMOL/L (ref 7–19)
AST SERPL-CCNC: 22 U/L (ref 5–40)
BASOPHILS ABSOLUTE: 0.1 K/UL (ref 0–0.2)
BASOPHILS RELATIVE PERCENT: 0.8 % (ref 0–1)
BILIRUB SERPL-MCNC: 0.3 MG/DL (ref 0.2–1.2)
BUN BLDV-MCNC: 13 MG/DL (ref 6–20)
CALCIUM SERPL-MCNC: 9.3 MG/DL (ref 8.6–10)
CHLORIDE BLD-SCNC: 106 MMOL/L (ref 98–111)
CO2: 24 MMOL/L (ref 22–29)
CREAT SERPL-MCNC: 1.1 MG/DL (ref 0.5–1.2)
EOSINOPHILS ABSOLUTE: 0.6 K/UL (ref 0–0.6)
EOSINOPHILS RELATIVE PERCENT: 6.3 % (ref 0–5)
GFR NON-AFRICAN AMERICAN: >60
GLUCOSE BLD-MCNC: 89 MG/DL (ref 74–109)
HCT VFR BLD CALC: 46.5 % (ref 42–52)
HEMOGLOBIN: 15.9 G/DL (ref 14–18)
IMMATURE GRANULOCYTES #: 0 K/UL
LIPASE: 33 U/L (ref 13–60)
LYMPHOCYTES ABSOLUTE: 3.7 K/UL (ref 1.1–4.5)
LYMPHOCYTES RELATIVE PERCENT: 40.1 % (ref 20–40)
MCH RBC QN AUTO: 30.2 PG (ref 27–31)
MCHC RBC AUTO-ENTMCNC: 34.2 G/DL (ref 33–37)
MCV RBC AUTO: 88.2 FL (ref 80–94)
MONOCYTES ABSOLUTE: 0.8 K/UL (ref 0–0.9)
MONOCYTES RELATIVE PERCENT: 8.5 % (ref 0–10)
NEUTROPHILS ABSOLUTE: 4 K/UL (ref 1.5–7.5)
NEUTROPHILS RELATIVE PERCENT: 44.1 % (ref 50–65)
PDW BLD-RTO: 12.6 % (ref 11.5–14.5)
PLATELET # BLD: 236 K/UL (ref 130–400)
PMV BLD AUTO: 9.5 FL (ref 9.4–12.4)
POTASSIUM REFLEX MAGNESIUM: 4.3 MMOL/L (ref 3.5–5)
RBC # BLD: 5.27 M/UL (ref 4.7–6.1)
SODIUM BLD-SCNC: 139 MMOL/L (ref 136–145)
TOTAL PROTEIN: 6.5 G/DL (ref 6.6–8.7)
WBC # BLD: 9.1 K/UL (ref 4.8–10.8)

## 2019-09-13 PROCEDURE — 2580000003 HC RX 258: Performed by: EMERGENCY MEDICINE

## 2019-09-13 PROCEDURE — 80053 COMPREHEN METABOLIC PANEL: CPT

## 2019-09-13 PROCEDURE — 99999 PR OFFICE/OUTPT VISIT,PROCEDURE ONLY: CPT | Performed by: EMERGENCY MEDICINE

## 2019-09-13 PROCEDURE — 6360000002 HC RX W HCPCS: Performed by: EMERGENCY MEDICINE

## 2019-09-13 PROCEDURE — 96374 THER/PROPH/DIAG INJ IV PUSH: CPT

## 2019-09-13 PROCEDURE — 83690 ASSAY OF LIPASE: CPT

## 2019-09-13 PROCEDURE — 85025 COMPLETE CBC W/AUTO DIFF WBC: CPT

## 2019-09-13 PROCEDURE — 99283 EMERGENCY DEPT VISIT LOW MDM: CPT

## 2019-09-13 PROCEDURE — 36415 COLL VENOUS BLD VENIPUNCTURE: CPT

## 2019-09-13 RX ORDER — SODIUM CHLORIDE, SODIUM LACTATE, POTASSIUM CHLORIDE, CALCIUM CHLORIDE 600; 310; 30; 20 MG/100ML; MG/100ML; MG/100ML; MG/100ML
1000 INJECTION, SOLUTION INTRAVENOUS ONCE
Status: COMPLETED | OUTPATIENT
Start: 2019-09-13 | End: 2019-09-13

## 2019-09-13 RX ORDER — METOCLOPRAMIDE HYDROCHLORIDE 5 MG/ML
10 INJECTION INTRAMUSCULAR; INTRAVENOUS ONCE
Status: COMPLETED | OUTPATIENT
Start: 2019-09-13 | End: 2019-09-13

## 2019-09-13 RX ORDER — HYOSCYAMINE SULFATE 0.12 MG/1
0.12 TABLET SUBLINGUAL
Qty: 30 EACH | Refills: 0 | Status: SHIPPED | OUTPATIENT
Start: 2019-09-13 | End: 2021-12-11

## 2019-09-13 RX ADMIN — METOCLOPRAMIDE 10 MG: 5 INJECTION, SOLUTION INTRAMUSCULAR; INTRAVENOUS at 13:12

## 2019-09-13 RX ADMIN — SODIUM CHLORIDE, POTASSIUM CHLORIDE, SODIUM LACTATE AND CALCIUM CHLORIDE 1000 ML: 600; 310; 30; 20 INJECTION, SOLUTION INTRAVENOUS at 13:12

## 2019-09-13 ASSESSMENT — ENCOUNTER SYMPTOMS
EYE PAIN: 0
VOMITING: 0
DIARRHEA: 1
EYE REDNESS: 0
VOICE CHANGE: 0
COUGH: 0
RHINORRHEA: 0
NAUSEA: 1
ABDOMINAL PAIN: 1
SHORTNESS OF BREATH: 0

## 2019-09-13 NOTE — ED PROVIDER NOTES
[facility] EMERGENCY DEPT  EMERGENCY DEPARTMENT ENCOUNTER      Pt Name: Brody Khan  MRN: [id number]  [place] 1995  Date of evaluation: 9/13/2019  Provider: Ambika Leal MD    CHIEF COMPLAINT       Chief Complaint   Patient presents with    Abdominal Pain     seen on Monday for same c/o         HISTORY OF PRESENT ILLNESS   (Location/Symptom, Timing/Onset,Context/Setting, Quality, Duration, Modifying Factors, Severity)  Note limiting factors. Brody Khan is a 21 y.o. male who presents to the emergency department with complaint of mid abdominal pain that is been present over the last 5 days. Patient was here several days ago and had work-up including CT scan that showed ileocolitis. Patient states he is continued to have 4-5 episodes of diarrhea per day since then. Has associated nausea but no vomiting. Denies any recent fevers. Patient has been taking ciprofloxacin and Flagyl and Zofran that he was discharged with. Is not noticed any significant improvement in the pain or diarrhea, but has had improvement of the nausea. Patient is concerned about potential inflammatory bowel disease after researching his diagnosis at home. States that he has had GI issues since approximately age 14 that he feels like could be related. No family history of inflammatory bowel disease or other rheumatologic conditions. HPI    NursingNotes were reviewed. REVIEW OF SYSTEMS    (2-9 systems for level 4, 10 or more for level 5)     Review of Systems   Constitutional: Negative for fatigue and fever. HENT: Negative for congestion, rhinorrhea and voice change. Eyes: Negative for pain and redness. Respiratory: Negative for cough and shortness of breath. Cardiovascular: Negative for chest pain. Gastrointestinal: Positive for abdominal pain, diarrhea and nausea. Negative for vomiting. Endocrine: Negative. Genitourinary: Negative.     Musculoskeletal: Negative for arthralgias and gait

## 2021-12-11 ENCOUNTER — HOSPITAL ENCOUNTER (EMERGENCY)
Age: 26
Discharge: HOME | End: 2021-12-11
Attending: EMERGENCY MEDICINE
Payer: MEDICAID

## 2021-12-11 VITALS
HEIGHT: 68 IN | HEART RATE: 98 BPM | RESPIRATION RATE: 18 BRPM | TEMPERATURE: 98.5 F | OXYGEN SATURATION: 96 % | SYSTOLIC BLOOD PRESSURE: 125 MMHG | WEIGHT: 215 LBS | BODY MASS INDEX: 32.58 KG/M2 | DIASTOLIC BLOOD PRESSURE: 83 MMHG

## 2021-12-11 DIAGNOSIS — L02.216 ABSCESS OF UMBILICUS: Primary | ICD-10-CM

## 2021-12-11 LAB
ALBUMIN SERPL-MCNC: 4.2 G/DL (ref 3.5–5.2)
ALP BLD-CCNC: 91 U/L (ref 40–130)
ALT SERPL-CCNC: 26 U/L (ref 5–41)
ANION GAP SERPL CALCULATED.3IONS-SCNC: 10 MMOL/L (ref 7–19)
AST SERPL-CCNC: 19 U/L (ref 5–40)
BASOPHILS ABSOLUTE: 0 K/UL (ref 0–0.2)
BASOPHILS RELATIVE PERCENT: 0.3 % (ref 0–1)
BILIRUB SERPL-MCNC: 0.3 MG/DL (ref 0.2–1.2)
BUN BLDV-MCNC: 11 MG/DL (ref 6–20)
CALCIUM SERPL-MCNC: 8.8 MG/DL (ref 8.6–10)
CHLORIDE BLD-SCNC: 103 MMOL/L (ref 98–111)
CO2: 22 MMOL/L (ref 22–29)
CREAT SERPL-MCNC: 0.9 MG/DL (ref 0.5–1.2)
EOSINOPHILS ABSOLUTE: 0.4 K/UL (ref 0–0.6)
EOSINOPHILS RELATIVE PERCENT: 3.3 % (ref 0–5)
GFR AFRICAN AMERICAN: >59
GFR NON-AFRICAN AMERICAN: >60
GLUCOSE BLD-MCNC: 95 MG/DL (ref 74–109)
HCT VFR BLD CALC: 47.3 % (ref 42–52)
HEMOGLOBIN: 15.8 G/DL (ref 14–18)
IMMATURE GRANULOCYTES #: 0 K/UL
LIPASE: 21 U/L (ref 13–60)
LYMPHOCYTES ABSOLUTE: 4.3 K/UL (ref 1.1–4.5)
LYMPHOCYTES RELATIVE PERCENT: 38.6 % (ref 20–40)
MCH RBC QN AUTO: 29.1 PG (ref 27–31)
MCHC RBC AUTO-ENTMCNC: 33.4 G/DL (ref 33–37)
MCV RBC AUTO: 87.1 FL (ref 80–94)
MONOCYTES ABSOLUTE: 0.7 K/UL (ref 0–0.9)
MONOCYTES RELATIVE PERCENT: 6.4 % (ref 0–10)
NEUTROPHILS ABSOLUTE: 5.7 K/UL (ref 1.5–7.5)
NEUTROPHILS RELATIVE PERCENT: 51.1 % (ref 50–65)
PDW BLD-RTO: 12.2 % (ref 11.5–14.5)
PLATELET # BLD: 243 K/UL (ref 130–400)
PMV BLD AUTO: 9.3 FL (ref 9.4–12.4)
POTASSIUM SERPL-SCNC: 3.8 MMOL/L (ref 3.5–5)
RBC # BLD: 5.43 M/UL (ref 4.7–6.1)
SODIUM BLD-SCNC: 135 MMOL/L (ref 136–145)
TOTAL PROTEIN: 6.6 G/DL (ref 6.6–8.7)
WBC # BLD: 11.2 K/UL (ref 4.8–10.8)

## 2021-12-11 PROCEDURE — 80053 COMPREHEN METABOLIC PANEL: CPT

## 2021-12-11 PROCEDURE — 6370000000 HC RX 637 (ALT 250 FOR IP): Performed by: EMERGENCY MEDICINE

## 2021-12-11 PROCEDURE — 36415 COLL VENOUS BLD VENIPUNCTURE: CPT

## 2021-12-11 PROCEDURE — 85025 COMPLETE CBC W/AUTO DIFF WBC: CPT

## 2021-12-11 PROCEDURE — 99282 EMERGENCY DEPT VISIT SF MDM: CPT

## 2021-12-11 PROCEDURE — 83690 ASSAY OF LIPASE: CPT

## 2021-12-11 RX ORDER — SULFAMETHOXAZOLE AND TRIMETHOPRIM 800; 160 MG/1; MG/1
1 TABLET ORAL ONCE
Status: COMPLETED | OUTPATIENT
Start: 2021-12-11 | End: 2021-12-11

## 2021-12-11 RX ORDER — SULFAMETHOXAZOLE AND TRIMETHOPRIM 800; 160 MG/1; MG/1
1 TABLET ORAL 2 TIMES DAILY
Qty: 14 TABLET | Refills: 0 | Status: SHIPPED | OUTPATIENT
Start: 2021-12-11 | End: 2021-12-18

## 2021-12-11 RX ADMIN — SULFAMETHOXAZOLE AND TRIMETHOPRIM 1 TABLET: 800; 160 TABLET ORAL at 20:15

## 2021-12-11 ASSESSMENT — PAIN SCALES - GENERAL: PAINLEVEL_OUTOF10: 0

## 2021-12-11 ASSESSMENT — PAIN DESCRIPTION - FREQUENCY: FREQUENCY: CONTINUOUS

## 2021-12-11 ASSESSMENT — PAIN DESCRIPTION - PAIN TYPE: TYPE: ACUTE PAIN

## 2021-12-11 ASSESSMENT — PAIN DESCRIPTION - LOCATION: LOCATION: ABDOMEN

## 2021-12-12 NOTE — ED TRIAGE NOTES
Patient presents with chief complaint of sharp abdominal pain.  Patient also complains of blood tinged, purulent drainage from umbilicus

## 2021-12-12 NOTE — ED PROVIDER NOTES
Social History     Socioeconomic History    Marital status:      Spouse name: None    Number of children: None    Years of education: None    Highest education level: None   Occupational History    None   Tobacco Use    Smoking status: Current Every Day Smoker     Packs/day: 0.50     Types: Cigarettes    Smokeless tobacco: Never Used   Substance and Sexual Activity    Alcohol use: No    Drug use: No    Sexual activity: None   Other Topics Concern    None   Social History Narrative    None     Social Determinants of Health     Financial Resource Strain:     Difficulty of Paying Living Expenses: Not on file   Food Insecurity:     Worried About Running Out of Food in the Last Year: Not on file    Kusum of Food in the Last Year: Not on file   Transportation Needs:     Lack of Transportation (Medical): Not on file    Lack of Transportation (Non-Medical):  Not on file   Physical Activity:     Days of Exercise per Week: Not on file    Minutes of Exercise per Session: Not on file   Stress:     Feeling of Stress : Not on file   Social Connections:     Frequency of Communication with Friends and Family: Not on file    Frequency of Social Gatherings with Friends and Family: Not on file    Attends Caodaism Services: Not on file    Active Member of [address] [place] [address] or Organizations: Not on file    Attends Club or Organization Meetings: Not on file    Marital Status: Not on file   Intimate Partner Violence:     Fear of Current or Ex-Partner: Not on file    Emotionally Abused: Not on file    Physically Abused: Not on file    Sexually Abused: Not on file   Housing Stability:     Unable to Pay for Housing in the Last Year: Not on file    Number of [place] in the Last Year: Not on file    Unstable Housing in the Last Year: Not on file       SCREENINGS             PHYSICAL EXAM    (up to 7 for level 4, 8 or more for level 5)     ED Triage Vitals [12/11/21 1807]   BP Temp Temp Source Pulse Resp SpO2 Height Weight   128/89 98.5 °F (36.9 °C) Oral 98 18 96 % 5' 8\" (1.727 m) 215 lb (97.5 kg)       Physical Exam  Vitals and nursing note reviewed. HENT:      Head: Atraumatic. Mouth/Throat:      Mouth: Mucous membranes are not dry. Eyes:      Pupils: Pupils are equal, round, and reactive to light. Neck:      Trachea: No tracheal deviation. Cardiovascular:      Rate and Rhythm: Normal rate and regular rhythm. Heart sounds: Normal heart sounds. No murmur heard. Pulmonary:      Effort: Pulmonary effort is normal. No respiratory distress. Breath sounds: Normal breath sounds. No stridor. Abdominal:      General: There is no distension. Palpations: Abdomen is soft. Tenderness: There is abdominal tenderness. There is no guarding. Comments: There is a small amount of surrounding erythema around the umbilicus. On probing of the umbilical area there is some purulent material and drainage. Skin:     Coloration: Skin is not pale. Findings: No rash. Neurological:      Mental Status: He is alert and oriented to person, place, and time. Psychiatric:         Behavior: Behavior is cooperative. DIAGNOSTIC RESULTS         LABS:  Labs Reviewed   COMPREHENSIVE METABOLIC PANEL - Abnormal; Notable for the following components:       Result Value    Sodium 135 (*)     All other components within normal limits   CBC WITH AUTO DIFFERENTIAL - Abnormal; Notable for the following components:    WBC 11.2 (*)     MPV 9.3 (*)     All other components within normal limits   LIPASE       All other labs were within normal range or not returned as of this dictation.     EMERGENCY DEPARTMENT COURSE and DIFFERENTIAL DIAGNOSIS/MDM:   Vitals:    Vitals:    12/11/21 1807 12/11/21 1900   BP: 128/89 125/83   Pulse: 98    Resp: 18    Temp: 98.5 °F (36.9 °C)    TempSrc: Oral    SpO2: 96% 96%   Weight: 215 lb (97.5 kg)    Height: 5' 8\" (1.727 m)        MDM    PROCEDURES:  Unless otherwise noted below, none     Procedures    FINAL IMPRESSION      1.  Abscess of umbilicus          DISPOSITION/PLAN   DISPOSITION Decision To Discharge 12/11/2021 07:45:14 PM      PATIENT REFERRED TO:  [facility] EMERGENCY DEPT  Reji Bateman  [phone]          DISCHARGE MEDICATIONS:  Discharge Medication List as of 12/11/2021  8:10 PM      START taking these medications    Details   sulfamethoxazole-trimethoprim (BACTRIM DS) 800-160 MG per tablet Take 1 tablet by mouth 2 times daily for 7 days, Disp-14 tablet, R-0Normal                (Please note that portions of this note were completed with a voice recognition program.  Efforts were made to edit thedictations but occasionally words are mis-transcribed.)    Kristen Bailey MD (electronically signed)  Attending Emergency Physician         Kristen Bailey MD  01/03/22 [phone]

## 2021-12-12 NOTE — ED NOTES
Pt presents with complaints of mid umbilical pain that radiates to left flank, onset yesterday. Pt states he noticed swelling as of yesterday too. Today, patient states he began experiencing discharge and bleeding from belly button.       Lazara Garcia RN  12/11/21 1910

## 2022-01-03 ASSESSMENT — ENCOUNTER SYMPTOMS
NAUSEA: 0
ABDOMINAL PAIN: 1
SHORTNESS OF BREATH: 0
VOMITING: 0

## 2024-02-02 ENCOUNTER — HOSPITAL ENCOUNTER (EMERGENCY)
Age: 29
Discharge: LEFT BEFORE DISCHARGE | End: 2024-02-02
Payer: MEDICAID

## 2024-02-02 ENCOUNTER — APPOINTMENT (OUTPATIENT)
Dept: CT IMAGING | Age: 29
End: 2024-02-02
Payer: MEDICAID

## 2024-02-02 VITALS
OXYGEN SATURATION: 98 % | SYSTOLIC BLOOD PRESSURE: 126 MMHG | TEMPERATURE: 97.9 F | RESPIRATION RATE: 18 BRPM | WEIGHT: 215 LBS | BODY MASS INDEX: 32.69 KG/M2 | HEART RATE: 63 BPM | DIASTOLIC BLOOD PRESSURE: 85 MMHG

## 2024-02-02 DIAGNOSIS — R10.9 BILATERAL FLANK PAIN: Primary | ICD-10-CM

## 2024-02-02 DIAGNOSIS — R10.30 LOWER ABDOMINAL PAIN: ICD-10-CM

## 2024-02-02 LAB
ALBUMIN SERPL-MCNC: 4.1 G/DL (ref 3.5–5.2)
ALP SERPL-CCNC: 74 U/L (ref 40–130)
ALT SERPL-CCNC: 18 U/L (ref 5–41)
ANION GAP SERPL CALCULATED.3IONS-SCNC: 9 MMOL/L (ref 7–19)
AST SERPL-CCNC: 21 U/L (ref 5–40)
BASOPHILS # BLD: 0 K/UL (ref 0–0.2)
BASOPHILS NFR BLD: 0.7 % (ref 0–1)
BILIRUB SERPL-MCNC: <0.2 MG/DL (ref 0.2–1.2)
BILIRUB UR QL STRIP: NEGATIVE
BUN SERPL-MCNC: 13 MG/DL (ref 6–20)
CALCIUM SERPL-MCNC: 8.5 MG/DL (ref 8.6–10)
CHLORIDE SERPL-SCNC: 106 MMOL/L (ref 98–111)
CLARITY UR: CLEAR
CO2 SERPL-SCNC: 25 MMOL/L (ref 22–29)
COLOR UR: YELLOW
CREAT SERPL-MCNC: 1.3 MG/DL (ref 0.5–1.2)
EOSINOPHIL # BLD: 0.1 K/UL (ref 0–0.6)
EOSINOPHIL NFR BLD: 2.2 % (ref 0–5)
ERYTHROCYTE [DISTWIDTH] IN BLOOD BY AUTOMATED COUNT: 12.8 % (ref 11.5–14.5)
GLUCOSE SERPL-MCNC: 89 MG/DL (ref 74–109)
GLUCOSE UR STRIP.AUTO-MCNC: NEGATIVE MG/DL
HCT VFR BLD AUTO: 44.6 % (ref 42–52)
HGB BLD-MCNC: 15 G/DL (ref 14–18)
HGB UR STRIP.AUTO-MCNC: NEGATIVE MG/L
IMM GRANULOCYTES # BLD: 0 K/UL
KETONES UR STRIP.AUTO-MCNC: NEGATIVE MG/DL
LEUKOCYTE ESTERASE UR QL STRIP.AUTO: NEGATIVE
LIPASE SERPL-CCNC: 126 U/L (ref 13–60)
LYMPHOCYTES # BLD: 3.4 K/UL (ref 1.1–4.5)
LYMPHOCYTES NFR BLD: 58.5 % (ref 20–40)
MCH RBC QN AUTO: 29.8 PG (ref 27–31)
MCHC RBC AUTO-ENTMCNC: 33.6 G/DL (ref 33–37)
MCV RBC AUTO: 88.5 FL (ref 80–94)
MONOCYTES # BLD: 0.5 K/UL (ref 0–0.9)
MONOCYTES NFR BLD: 9.2 % (ref 0–10)
NEUTROPHILS # BLD: 1.7 K/UL (ref 1.5–7.5)
NEUTS SEG NFR BLD: 29.2 % (ref 50–65)
NITRITE UR QL STRIP.AUTO: NEGATIVE
PH UR STRIP.AUTO: 6 [PH] (ref 5–8)
PLATELET # BLD AUTO: 209 K/UL (ref 130–400)
PMV BLD AUTO: 9.1 FL (ref 9.4–12.4)
POTASSIUM SERPL-SCNC: 4.1 MMOL/L (ref 3.5–5)
PROT SERPL-MCNC: 6.4 G/DL (ref 6.6–8.7)
PROT UR STRIP.AUTO-MCNC: NEGATIVE MG/DL
RBC # BLD AUTO: 5.04 M/UL (ref 4.7–6.1)
SODIUM SERPL-SCNC: 140 MMOL/L (ref 136–145)
SP GR UR STRIP.AUTO: 1.03 (ref 1–1.03)
UROBILINOGEN UR STRIP.AUTO-MCNC: 1 E.U./DL
WBC # BLD AUTO: 5.9 K/UL (ref 4.8–10.8)

## 2024-02-02 PROCEDURE — 83690 ASSAY OF LIPASE: CPT

## 2024-02-02 PROCEDURE — 81003 URINALYSIS AUTO W/O SCOPE: CPT

## 2024-02-02 PROCEDURE — 85025 COMPLETE CBC W/AUTO DIFF WBC: CPT

## 2024-02-02 PROCEDURE — 6360000004 HC RX CONTRAST MEDICATION: Performed by: NURSE PRACTITIONER

## 2024-02-02 PROCEDURE — 36415 COLL VENOUS BLD VENIPUNCTURE: CPT

## 2024-02-02 PROCEDURE — 99285 EMERGENCY DEPT VISIT HI MDM: CPT

## 2024-02-02 PROCEDURE — 74177 CT ABD & PELVIS W/CONTRAST: CPT

## 2024-02-02 PROCEDURE — 2580000003 HC RX 258: Performed by: NURSE PRACTITIONER

## 2024-02-02 PROCEDURE — 80053 COMPREHEN METABOLIC PANEL: CPT

## 2024-02-02 RX ORDER — 0.9 % SODIUM CHLORIDE 0.9 %
1000 INTRAVENOUS SOLUTION INTRAVENOUS ONCE
Status: COMPLETED | OUTPATIENT
Start: 2024-02-02 | End: 2024-02-02

## 2024-02-02 RX ADMIN — SODIUM CHLORIDE 1000 ML: 9 INJECTION, SOLUTION INTRAVENOUS at 18:57

## 2024-02-02 RX ADMIN — IOPAMIDOL 70 ML: 755 INJECTION, SOLUTION INTRAVENOUS at 20:17

## 2024-02-02 ASSESSMENT — ENCOUNTER SYMPTOMS
DIARRHEA: 0
VOMITING: 0
CONSTIPATION: 0
ABDOMINAL PAIN: 1
NAUSEA: 1

## 2024-02-02 ASSESSMENT — PAIN SCALES - GENERAL: PAINLEVEL_OUTOF10: 4

## 2024-02-02 ASSESSMENT — PAIN DESCRIPTION - LOCATION: LOCATION: FLANK

## 2024-02-02 ASSESSMENT — PAIN - FUNCTIONAL ASSESSMENT: PAIN_FUNCTIONAL_ASSESSMENT: 0-10

## 2024-02-03 NOTE — ED PROVIDER NOTES
[facility] EMERGENCY DEPT  eMERGENCY dEPARTMENT eNCOUnter      Pt Name: Too Hanna  MRN: [id number]  Birthdate 1995  Date of evaluation: 2/2/2024  Provider: KARY Richard    CHIEF COMPLAINT       Chief Complaint   Patient presents with    Flank Pain     Since Tuesday lower back pain and fever          HISTORY OF PRESENT ILLNESS   (Location/Symptom, Timing/Onset,Context/Setting, Quality, Duration, Modifying Factors, Severity)  Note limiting factors.   Too Hanna is a 28 y.o. male who presents to the emergency department   With lower abdomen and bilateral flank pain that is gone on for 2 days.  Patient has had a low-grade fever.  He denies any nausea or vomiting.  He has not had any change in bowel or bladder habits.  His temperature is gotten to 99.  He has had an appendectomy previously.  Patient does complain of dysuria.  He denies any hematuria or frequency      The history is provided by the patient.   Abdominal Pain  Pain location:  Suprapubic  Pain quality: aching    Pain radiates to:  L flank and R flank  Pain severity:  Moderate  Onset quality:  Sudden  Duration:  2 days  Timing:  Constant  Progression:  Unchanged  Chronicity:  New  Associated symptoms: dysuria, fever and nausea    Associated symptoms: no constipation, no diarrhea, no hematuria and no vomiting        NursingNotes were reviewed.    REVIEW OF SYSTEMS    (2-9 systems for level 4, 10 or more for level 5)     Review of Systems   Constitutional:  Positive for fever.   Gastrointestinal:  Positive for abdominal pain and nausea. Negative for constipation, diarrhea and vomiting.   Genitourinary:  Positive for dysuria. Negative for difficulty urinating, frequency, hematuria, penile discharge and testicular pain.       Except as noted above the remainder of the review of systems was reviewed and negative.       PAST MEDICAL HISTORY     Past Medical History:   Diagnosis Date    Depression          SURGICALHISTORY       Past

## 2024-02-03 NOTE — ED NOTES
Pt eloped from ED. Unsure if pt still had IV access. This RN spoke to [facility] and officer said they will send a unit to pt house.

## 2025-03-26 ENCOUNTER — TELEPHONE (OUTPATIENT)
Dept: NEUROSURGERY | Age: 30
End: 2025-03-26

## 2025-03-26 NOTE — TELEPHONE ENCOUNTER
[facility] New Patient Questionnaire    Diagnosis/Reason for Referral?    Wedge compression fracture of T5-T6 vertebra, initial encounter for closed fracture     2. Who is completing questionnaire?      Patient  Caregiver Family      3. Has the patient had any previous spinal/brain surgeries?  NO      A. If yes, what is the name of the facility in which the surgery was performed?       B. Procedure/Surgery performed?       C. Who was the surgeon?       D. When was the surgery?    MM/YY       E. Did the patient improve after the surgery?        4. Is this a second opinion?   If yes, Dr. Zabala would like to review patient first before making the appointment.      5. Have MRI Images been obtain within the last year?     Yes  No      XR  CT     If yes, where was the [place]  [facility] [place]   If yes, what part of the body?      Lumbar  Cervical  Thoracic  Brain     If yes, when was it obtained?      MM/YY    Note: if the scan was performed at a facility other than [facility], the disc will need to be brought to the appointment or we need to reach out to obtain the disc.     A. Was the patient instructed to provide the disc?      Yes   No      8. Has the patient had a NCV/EMG within the last year?      Yes  No     If yes, where was it performed and date?      MM/YY  Location:      9. Has the patient been to Physical Therapy?      Yes  No     If yes, what location, how long attended, and last visit?    Location:        Therapy Lasted:    Date of Last Visit:      10. Has the patient been to Pain Management?     Yes  No     If yes, what location and last visit     Location:   Last Visit:   Is it helping?

## 2025-04-01 ENCOUNTER — OFFICE VISIT (OUTPATIENT)
Dept: NEUROSURGERY | Age: 30
End: 2025-04-01
Payer: MEDICAID

## 2025-04-01 VITALS
HEIGHT: 68 IN | BODY MASS INDEX: 31.83 KG/M2 | HEART RATE: 70 BPM | SYSTOLIC BLOOD PRESSURE: 110 MMHG | DIASTOLIC BLOOD PRESSURE: 78 MMHG | WEIGHT: 210 LBS

## 2025-04-01 DIAGNOSIS — M54.6 CHRONIC MIDLINE THORACIC BACK PAIN: ICD-10-CM

## 2025-04-01 DIAGNOSIS — G89.29 CHRONIC BILATERAL LOW BACK PAIN WITH LEFT-SIDED SCIATICA: Primary | ICD-10-CM

## 2025-04-01 DIAGNOSIS — G89.29 CHRONIC MIDLINE THORACIC BACK PAIN: ICD-10-CM

## 2025-04-01 DIAGNOSIS — M54.42 CHRONIC BILATERAL LOW BACK PAIN WITH LEFT-SIDED SCIATICA: Primary | ICD-10-CM

## 2025-04-01 PROCEDURE — 99204 OFFICE O/P NEW MOD 45 MIN: CPT | Performed by: NEUROLOGICAL SURGERY

## 2025-04-01 RX ORDER — IBUPROFEN 800 MG/1
TABLET, FILM COATED ORAL
COMMUNITY
Start: 2025-03-11

## 2025-04-01 RX ORDER — INDOMETHACIN 50 MG/1
50 CAPSULE ORAL 3 TIMES DAILY
COMMUNITY
Start: 2025-03-19

## 2025-04-01 RX ORDER — CYCLOBENZAPRINE HYDROCHLORIDE 7.5 MG/1
TABLET, FILM COATED ORAL
COMMUNITY
Start: 2025-03-19

## 2025-04-01 ASSESSMENT — ENCOUNTER SYMPTOMS
GASTROINTESTINAL NEGATIVE: 1
BACK PAIN: 1
EYES NEGATIVE: 1
RESPIRATORY NEGATIVE: 1

## 2025-04-01 NOTE — PROGRESS NOTES
[facility]  Office Visit        Chief Complaint   Patient presents with    New Patient     Establishing care    Results     XR in pacs, report in media    Back Pain     Patient is here for back pain after compression fracture, no specific accident reported. He does not go to PT or PM. Back pain radiates down left leg to knee.     Numbness     Patient states that he has left leg numbness and weakness.        HISTORY OF PRESENT ILLNESS:      The patient is a 29 y.o. male who presents for neurosurgical evaluation of back pain.  He states his back has bothered him intermittently since he fell off a room approximately 10 years ago.  He states he re-injured his back recently lifting at work.  He describes midline interscapular thoracic back pain that does not radiate.  He also describe midline and left-sided paraspinal lower back pain that will radiate down his left leg to his knee.  He reports intermittent tingling and weakness in his left leg.  He has attempted chiropractic manipulation and physical therapy in the past, but not recently.  He has not seen pain management or attempted injections.  He has no prior history of spinal surgery.       MEDICAL HISTORY:             Past Medical History:   Diagnosis Date    Depression        Past Surgical History:   Procedure Laterality Date    ANKLE SURGERY Left     LAPAROSCOPIC APPENDECTOMY N/A 2/8/2019    APPENDECTOMY LAPAROSCOPIC performed by Jose Rivera MD at [facility] OR    TONSILLECTOMY AND ADENOIDECTOMY           Current Outpatient Medications:     cyclobenzaprine (FEXMID) 7.5 MG tablet, , Disp: , Rfl:     ibuprofen (ADVIL;MOTRIN) 800 MG tablet, , Disp: , Rfl:     indomethacin (INDOCIN) 50 MG capsule, Take 1 capsule by mouth 3 times daily, Disp: , Rfl:     Allergies:  Patient has no known allergies.    Social History:   Social History     Tobacco Use   Smoking Status Every Day    Current packs/day: 0.50    Types: Cigarettes   Smokeless Tobacco Never     Social

## 2025-04-04 ENCOUNTER — HOSPITAL ENCOUNTER (OUTPATIENT)
Dept: MRI IMAGING | Age: 30
Discharge: HOME | End: 2025-04-04
Attending: NEUROLOGICAL SURGERY
Payer: MEDICAID

## 2025-04-04 DIAGNOSIS — G89.29 CHRONIC BILATERAL LOW BACK PAIN WITH LEFT-SIDED SCIATICA: ICD-10-CM

## 2025-04-04 DIAGNOSIS — M54.6 CHRONIC MIDLINE THORACIC BACK PAIN: ICD-10-CM

## 2025-04-04 DIAGNOSIS — G89.29 CHRONIC MIDLINE THORACIC BACK PAIN: ICD-10-CM

## 2025-04-04 DIAGNOSIS — M54.42 CHRONIC BILATERAL LOW BACK PAIN WITH LEFT-SIDED SCIATICA: ICD-10-CM

## 2025-04-04 PROCEDURE — 72148 MRI LUMBAR SPINE W/O DYE: CPT

## 2025-04-15 ENCOUNTER — HOSPITAL ENCOUNTER (OUTPATIENT)
Dept: PHYSICAL THERAPY | Age: 30
Setting detail: THERAPIES SERIES
Discharge: HOME | End: 2025-04-15
Payer: MEDICAID

## 2025-04-15 PROCEDURE — 97162 PT EVAL MOD COMPLEX 30 MIN: CPT

## 2025-04-15 PROCEDURE — 97110 THERAPEUTIC EXERCISES: CPT

## 2025-04-15 ASSESSMENT — PAIN SCALES - GENERAL: PAINLEVEL_OUTOF10: 3

## 2025-04-15 ASSESSMENT — PAIN DESCRIPTION - PAIN TYPE: TYPE: CHRONIC PAIN

## 2025-04-15 ASSESSMENT — PAIN DESCRIPTION - ORIENTATION: ORIENTATION: RIGHT;LEFT

## 2025-04-15 ASSESSMENT — PAIN DESCRIPTION - DESCRIPTORS: DESCRIPTORS: ACHING;TINGLING;NUMBNESS

## 2025-04-15 ASSESSMENT — PAIN DESCRIPTION - LOCATION: LOCATION: BACK

## 2025-04-15 NOTE — PROGRESS NOTES
RLE>LLE.  Heel lift inserted in left shoe)  Exercise 6: Supine, 1 GAVINO, 2 GAVINO, 10 reps  Exercise 7: Supine, pelvic tilt, 10 reps  Exercise 8: Supine, partial crunch, 10 reps; Prone: Plank, 10 sec hold, 5 reps;   All 4's; alt arm leg raise, 10 reps  Exercise 9: Sitting, LAQ, 6#, 10 reps  Exercise 10: Sitting, sit to stand, mat to chair progression, 10 reps  Exercise 11: Standing, multifidus row, Paloff press, shoulder retraction, 10 reps  Exercise 12: Standing, hip abd, ext, mini squat, heel raise  Exercise 13: Standing, press down on top of physio ball for abd contraction, 10 reps  Exercise 14: LBE, 5 min  Exercise 15: Body mechanics lifting instruction, 12\"-knuckle, empty crate, 5 reps (end goal is 50#)  Exercise 16: IASTM to thoracic/lumbar paraspinal mm, 3-5 min as needed  Exercise 17: Prone, thoracic and/or lumbar PA mobs, 3-5 min                          Therapy Time  Individual Time In: 0745       Individual Time Out: 0900  Minutes: 75  Timed Code Treatment Minutes: 70 Minutes     Therapist Signature: Cristhian Patrick, PT    Date: 4/15/2025     I certify that the above Therapy Services are being furnished while the patient is under my care. I agree with the treatment plan and certify that this therapy is necessary.      Physician's Signature:  ___________________________   Date:_______                                                                   Yonis Zabala MD        Physician Comments: _______________________________________________    Please sign and return to [facility] PHYSICAL THERAPY.  Please fax to the location listed below. THANK YOU for this referral!    [facility] PHYSICAL THERAPY  [address] [place] [id number]  Dept: [phone]  Dept Fax: [phone]  Loc: [phone]       POC NOTE

## 2025-04-18 ENCOUNTER — HOSPITAL ENCOUNTER (OUTPATIENT)
Dept: PHYSICAL THERAPY | Age: 30
Setting detail: THERAPIES SERIES
Discharge: HOME | End: 2025-04-18
Payer: MEDICAID

## 2025-04-18 PROCEDURE — 97110 THERAPEUTIC EXERCISES: CPT

## 2025-04-18 ASSESSMENT — PAIN SCALES - GENERAL: PAINLEVEL_OUTOF10: 2

## 2025-04-18 NOTE — PROGRESS NOTES
Physical Therapy: Daily Note   Patient: Too Hanna (29 y.o. male)   Examination Date: 2025  Plan of Care/Certification Expiration Date: 25    No data recorded   :  1995 # of Visits since SOC:   2   MRN: [id number]  CSN: [id number] Start of Care Date:   4/15/2025   Insurance: Payor: [facility] MEDICAID [place] / Plan: [facility] / Product Type: *No Product type* /   Insurance ID: [id number] - (Medicaid Managed) Secondary Insurance (if applicable):    Referring Physician: Yonis Zabala MD Brain Farrell   PCP: Kaushal Mejia MD Visits to Date/Visits Approved:     No Show/Cancelled Appts:   /       Medical Diagnosis: Other chronic pain [G89.29]  Lumbago with sciatica, left side [M54.42]  Pain in thoracic spine [M54.6] Low back pain  Treatment Diagnosis: Lumbago        SUBJECTIVE EXAMINATION   Pain Level: Pain Screening  Patient Currently in Pain: Yes  Pain Assessment: 0-10  Pain Level: 2    Patient Comments: Subjective: He rates pain at 2/10.    HEP Compliance: Good        OBJECTIVE EXAMINATION   Restrictions:  No data recorded No data recorded No data recorded              TREATMENT     Exercises:      Treatment Reasoning    Exercise 1: 6 MWT, treadmill, or LBE, 9 min, Level 3.5; Dist: 1.79  Exercise 2: Supine, TA series, 10 reps  Exercise 3: Supine, lower trunk rotation, 10 reps  Exercise 4: Supine, bridges, 10 reps  Exercise 6: Supine, 1 GVAINO, 2 GAVINO, 10 reps  Exercise 7: Supine, pelvic tilt, 10 reps  Exercise 8: Supine, partial crunch, 10 reps; Prone: Plank, 10 sec hold, 5 reps;   All 4's; alt arm leg raise, 10 reps  Exercise 9: Sitting, LAQ, 7#, 10 reps; Leg curls, t band, 10 reps  Exercise 10: Sitting, sit to stand, mat to chair progression, 10 reps  Exercise 11: Standing, multifidus row, Paloff press, shoulder retraction, 10 reps  Exercise 12: Standing, hip abd, ext, mini squat, heel raise  Exercise 13: Lunge, forward, side, 10 each  Exercise 18: Wall slide, 15 hold,

## 2025-04-22 ENCOUNTER — HOSPITAL ENCOUNTER (OUTPATIENT)
Dept: PHYSICAL THERAPY | Age: 30
Setting detail: THERAPIES SERIES
Discharge: HOME | End: 2025-04-22
Payer: MEDICAID

## 2025-04-22 PROCEDURE — 97110 THERAPEUTIC EXERCISES: CPT

## 2025-04-22 NOTE — PROGRESS NOTES
sit to stand, mat to chair progression, 10 reps  Exercise 11: Standing, multifidus row, Paloff press, shoulder retraction, green t-band 10 reps  Exercise 12: Standing, hip abd, ext, mini squat, heel raise  Exercise 13: Lunge, forward, side, 10 each- not today  Exercise 15: Body mechanics lifting instruction, 12\"-knuckle, empty crate, 5 reps (end goal is 50#)- not today  Exercise 16: IASTM to thoracic/lumbar paraspinal mm, 3-5 min as needed- not today  Exercise 17: Prone, thoracic and/or lumbar PA mobs, 3-5 min- not today  Exercise 18: Wall slide, 15 hold, 5 reps- not today  Exercise 19: Cybex leg press, 4 plates, 10 X 2                             HEP issued 04/22/2025                         ASSESSMENT     Assessment: Assessment: Patient did well with tx today with min to mod v.c. for proper tech with ex's.  He was issued HEP with good understanding of ex's for home. He reported decreased pain of 1/10 post tx.  Body Structures, Functions, Activity Limitations Requiring Skilled Therapeutic Intervention: Decreased functional mobility , Decreased ADL status, Decreased ROM, Decreased strength, Decreased high-level IADLs, Decreased endurance, Decreased posture, Increased pain    No data recorded  Therapy Prognosis: Good       GOALS   Patient Goals : To resume full work duties; to be stronger at home with less pain.  Short Term Goals Completed by 3-4 weeks Current Status Goal Status   Patient will improve ADL as demonstrated by mprovement of Oswestry Disability Index from 30% impairment to   10% impairment.       Patient will perform HEP with min cuing.       Patient will demonstrate safe lifting posture with min pain with 12\" to knuckle lift using lifing crate.                                                                   Long Term Goals Completed by   Current Status Goal Status   Patient will improve ADL as demonstrated by mprovement of Oswestry Disability Index from 30% impairment to <10% impairment.       Patient

## 2025-04-24 ENCOUNTER — APPOINTMENT (OUTPATIENT)
Dept: PHYSICAL THERAPY | Age: 30
End: 2025-04-24
Payer: MEDICAID

## 2025-04-29 ENCOUNTER — HOSPITAL ENCOUNTER (OUTPATIENT)
Dept: PHYSICAL THERAPY | Age: 30
Setting detail: THERAPIES SERIES
Discharge: HOME | End: 2025-04-29
Payer: MEDICAID

## 2025-04-29 PROCEDURE — 97110 THERAPEUTIC EXERCISES: CPT

## 2025-04-29 ASSESSMENT — PAIN DESCRIPTION - LOCATION: LOCATION: BACK

## 2025-04-29 ASSESSMENT — PAIN DESCRIPTION - PAIN TYPE: TYPE: CHRONIC PAIN

## 2025-04-29 ASSESSMENT — PAIN SCALES - GENERAL: PAINLEVEL_OUTOF10: 4

## 2025-04-29 NOTE — PROGRESS NOTES
Daily Treatment Note  Date: 2025  Patient Name: Too Hanna  MRN: [id number]     :   1995    Referring Physician: Yonis Zabala MD Brain Farrell   PCP: Kaushal Mejia MD (Inactive)    Medical Diagnosis: Other chronic pain [G89.29]  Lumbago with sciatica, left side [M54.42]  Pain in thoracic spine [M54.6] Low back pain  Treatment Diagnosis: Lumbago      Insurance: Payor: [employer] [place]ICAID [place] / Plan: [email or web address] [facility] [place] / Product Type: *No Product type* /   Insurance ID: [id number] - (Medicaid Managed)    Subjective:  General  Diagnosis: Low back pain  Referring Provider (secondary): Edmar Zabala  PT Insurance Information: [employer]  Total # of Visits Approved: 12  Total # of Visits to Date: 4  Plan of Care/Certification Expiration Date: 25  Progress Note Due Date: 25  Referring Provider (secondary): Edmar Zabala  Subjective: Pain about a 4 right now  Patient Currently in Pain: Yes  Pain Level: 4  Pain Type: Chronic pain  Pain Location: Back       Treatment Activities:  Exercises:      Treatment Reasoning    Exercise 1: 6 MWT, treadmill level 2.0 x 5 min--not today   ---, or LBE, 5 min, Level 3.5; Dist:1.02  Exercise 2: Supine, TA series, alone 10 sec x 10, with alt ue, alt le, alt ue/le 1 x 10 reps ea  Exercise 3: Supine, lower trunk rotation, 10 reps  Exercise 4: Supine, bridges, 10 reps  Exercise 5: Supine, check leg length, treat accordingly corrected with right knee push isometric into hip ext 5 sec x 5 due to righ le initially longer, demo'ed how to perform at home with dial eugenio  Exercise 6: Supine, 1 GAVINO, 2 GAVINO,  3-5 sec x 10 reps ea  Exercise 7: Supine, pelvic tilt, 10 reps  Exercise 8: Supine, partial crunch, 10 reps; Prone: Plank, 10 sec hold, 5 reps;   All 4's; alt arm leg raise, 10 reps  Exercise 9: Sitting, LAQ, 7.5#, 10 reps; Leg curls, green t band, 10 reps  Exercise 10: Sitting, sit to stand, mat to chair progression, 10 reps  small mat

## 2025-05-01 ENCOUNTER — HOSPITAL ENCOUNTER (OUTPATIENT)
Dept: PHYSICAL THERAPY | Age: 30
Setting detail: THERAPIES SERIES
Discharge: HOME | End: 2025-05-01
Payer: MEDICAID

## 2025-05-01 PROCEDURE — 97110 THERAPEUTIC EXERCISES: CPT

## 2025-05-01 ASSESSMENT — PAIN DESCRIPTION - DESCRIPTORS: DESCRIPTORS: TIGHTNESS

## 2025-05-01 ASSESSMENT — PAIN DESCRIPTION - LOCATION: LOCATION: BACK

## 2025-05-01 ASSESSMENT — PAIN DESCRIPTION - PAIN TYPE: TYPE: CHRONIC PAIN

## 2025-05-01 ASSESSMENT — PAIN DESCRIPTION - ORIENTATION: ORIENTATION: RIGHT;LEFT

## 2025-05-01 ASSESSMENT — PAIN SCALES - GENERAL: PAINLEVEL_OUTOF10: 3

## 2025-05-01 NOTE — PROGRESS NOTES
reps  Exercise 9: Sitting, LAQ, 7.5#, 10 reps; Leg curls, green t band, 10 reps  Exercise 10: Sitting, sit to stand, mat to chair progression, 10 reps chair  Exercise 11: Standing, multifidus row, Paloff press, shoulder retraction, green t-band 10 reps  Exercise 12: Standing, hip abd, ext, mini squat, heel raise  x 10  Exercise 14: LBE, 5 min: performed first  Exercise 15: Body mechanics lifting instruction, 12\"-knuckle, empty crate + 5 # weight, 5 reps (end goal is 50#)  Exercise 16: Cybex leg press, 4 plates, 10 X 2  Exercise 17: Wall slide, 25 hold, 2 reps  Exercise 18: IASTM to thoracic/lumbar paraspinal mm, 3-5 min as needed- not today  Exercise 19: Prone, thoracic and/or lumbar PA mobs, 3-5 min- not today  Exercise 20: HEP issued 04/22/2025      Assessment:   Conditions Requiring Skilled Therapeutic Intervention  Body Structures, Functions, Activity Limitations Requiring Skilled Therapeutic Intervention: Decreased functional mobility ;Decreased ADL status;Decreased ROM;Decreased strength;Decreased high-level IADLs;Decreased endurance;Decreased posture;Increased pain  Assessment: Patient performing exericses well with minimal cues.  He was able to add 5 pounds to box lift and perform wall sits today.  Treatment Diagnosis: Lumbago  Therapy Prognosis: Good      Goals:Short Term Goals  Time Frame for Short Term Goals: 3-4 weeks  Short Term Goal 1: Patient will improve ADL as demonstrated by mprovement of Oswestry Disability Index from 30% impairment to   10% impairment.  Short Term Goal 2: Patient will perform HEP with min cuing.  Short Term Goal 3: Patient will demonstrate safe lifting posture with min pain with 12\" to knuckle lift using lifing crate.  Long Term Goals  Long Term Goal 1: Patient will improve ADL as demonstrated by mprovement of Oswestry Disability Index from 30% impairment to <10% impairment.  Long Term Goal 2: Patient will perform HEP with no cuing.  Long Term Goal 3: Patient will demonstrate

## 2025-05-06 ENCOUNTER — HOSPITAL ENCOUNTER (OUTPATIENT)
Dept: PHYSICAL THERAPY | Age: 30
Setting detail: THERAPIES SERIES
Discharge: HOME | End: 2025-05-06
Payer: MEDICAID

## 2025-05-06 PROCEDURE — 97110 THERAPEUTIC EXERCISES: CPT

## 2025-05-06 ASSESSMENT — PAIN DESCRIPTION - DESCRIPTORS: DESCRIPTORS: ACHING;DULL

## 2025-05-06 ASSESSMENT — PAIN DESCRIPTION - PAIN TYPE: TYPE: CHRONIC PAIN

## 2025-05-06 ASSESSMENT — PAIN SCALES - GENERAL: PAINLEVEL_OUTOF10: 1

## 2025-05-06 ASSESSMENT — PAIN DESCRIPTION - LOCATION: LOCATION: BACK

## 2025-05-06 NOTE — PROGRESS NOTES
Physical Therapy  Daily Treatment Note  Date: 2025  Patient Name: Too Hanna  MRN: [id number]     :   1995    Subjective:   General  Additional Pertinent Hx: 29 year old male has been referred for PT eval and treatment for low back pain.  He has had increased pain since moving logs 6 weeks ago.  MRI of 2025: IMPRESSION:     Minor degenerative disc disease at L4-L5, and L5-S1.  No definite spinal canal or neural foraminal stenosis.  He has had ongoing back pain since fall from roof about 10 year ago.  He was told he had compression fracture which were not confirmed on recent x-ray.  He also suffered left belle fx with ORIF.  He had no PT for back. PMH: unremarkable  PT Visit Information  PT Insurance Information: [employer]  Total # of Visits Approved: 12  Total # of Visits to Date: 6  Plan of Care/Certification Expiration Date: 25  Progress Note Due Date: 25  Referring Provider (secondary): Edmar Zabala  Subjective: Feeling pretty good.    Pain Screening  Patient Currently in Pain: Yes  Pain Assessment: 0-10  Pain Level: 1  Pain Type: Chronic pain  Pain Location: Back  Pain Descriptors: Aching;Dull     Treatment Activities:    Exercises  Exercise 1: 6 MWT, treadmill level 2.0 x 5 min--not today   ---, or LBE, 5 min, Level 4; Dist:  1.15  Exercise 2: Supine, TA series, alone 10 sec x 10, with alt ue, alt le, alt ue/le 1 x 10 reps ea  Exercise 3: Supine, lower trunk rotation, 10 reps  Exercise 4: Supine, bridges, 10 reps  Exercise 5: Supine, check leg length, treat accordingly corrected with right knee push isometric into hip ext 5 sec x 5 due to righ le initially longer  Exercise 6: Supine, 1 GAVINO, 2 GAVINO,  3-5 sec x 10 reps ea  Exercise 7: Supine, pelvic tilt, 10 reps  Exercise 8: Supine, partial crunch, 10 reps; Prone: Plank, 10 sec hold, 5 reps;   All 4's; alt arm leg raise, 10 reps  Exercise 9: Sitting, LAQ, 7.5#, 10 reps; Leg curls, blue t band, 10 reps  Exercise 10: Sitting,

## 2025-05-08 ENCOUNTER — HOSPITAL ENCOUNTER (OUTPATIENT)
Dept: PHYSICAL THERAPY | Age: 30
Setting detail: THERAPIES SERIES
Discharge: HOME | End: 2025-05-08
Payer: MEDICAID

## 2025-05-08 PROCEDURE — 97530 THERAPEUTIC ACTIVITIES: CPT

## 2025-05-08 ASSESSMENT — PAIN DESCRIPTION - LOCATION: LOCATION: BACK

## 2025-05-08 ASSESSMENT — PAIN DESCRIPTION - PAIN TYPE: TYPE: CHRONIC PAIN

## 2025-05-08 ASSESSMENT — PAIN DESCRIPTION - ORIENTATION: ORIENTATION: LOWER

## 2025-05-08 ASSESSMENT — PAIN SCALES - GENERAL: PAINLEVEL_OUTOF10: 1

## 2025-05-08 NOTE — PROGRESS NOTES
Daily Treatment Note  Date: 2025  Patient Name: Too Hanna  MRN: [id number]     :   1995    Referring Physician: Yonis Zabala MD Brain Farrell   PCP: Kaushal Mejia MD (Inactive)    Medical Diagnosis: Other chronic pain [G89.29]  Lumbago with sciatica, left side [M54.42]  Pain in thoracic spine [M54.6] Low back pain  Treatment Diagnosis: Lumbago      Insurance: Payor: [facility] MEDICAID [place] / Plan: [employer] [facility] / Product Type: *No Product type* /   Insurance ID: [id number] - (Medicaid Managed)    Subjective:  General  Diagnosis: Low back pain  Referring Provider (secondary): Edmar Zabala  PT [facility]  Total # of Visits Approved: 12  Total # of Visits to Date: 7  Plan of Care/Certification Expiration Date: 25  Progress Note Due Date: 25  Referring Provider (secondary): Edmar Zabala  Subjective: i've seen some good progress over the past week  Patient Currently in Pain: Yes  Pain Level: 1  Pain Type: Chronic pain  Pain Location: Back  Pain Orientation: Lower       Treatment Activities:  Exercises:      Treatment Reasoning    Exercise 1: 6 MWT, treadmill level 2.0 x 5 min--not today   ---, or LBE, 5 min, Level 4; Dist:  1.18  Exercise 2: Supine, TA series, alone 10 sec x 10, with alt ue, alt le, alt ue/le 1 x 10 reps ea  Exercise 3: Supine, lower trunk rotation, 10 reps  Exercise 4: Supine, bridges, 10 reps  Exercise 5: Supine, check leg length, treat accordingly corrected with right knee push isometric into hip ext 5 sec x 5 due to righ le initially longer  : RLE longer  Exercise 6: Supine, 1 GAVINO, 2 GAVINO,  3-5 sec x 10 reps ea  Exercise 7: Supine, pelvic tilt, 10 reps  Exercise 8: Supine, partial crunch, 10 reps; Prone: Plank, 10 sec hold, 5 reps;   All 4's; alt arm leg raise, 10 reps  Exercise 9: Sitting, LAQ, 7.5#, 10 reps; Leg curls, blue t band, 10 reps  Exercise 10: Sitting, sit to stand, mat to chair progression, 10 reps

## 2025-05-13 ENCOUNTER — APPOINTMENT (OUTPATIENT)
Dept: PHYSICAL THERAPY | Age: 30
End: 2025-05-13
Payer: MEDICAID

## (undated) DEVICE — SUTURE MCRYL SZ 4-0 L18IN ABSRB UD L19MM PS-2 3/8 CIR PRIM Y496G

## (undated) DEVICE — CUTTER ENDOSCP L340MM LIN ARTC SGL STROKE FIRING ENDOPATH

## (undated) DEVICE — TROCAR ENDOSCP L100MM DIA12MM DIL TIP STBL SL ENDOPATH XCEL

## (undated) DEVICE — DECANTER VI VENT W/ VLV FOR ASEP TRNSF OF FLD

## (undated) DEVICE — BAG SPEC REM 224ML W4XL6IN DIA10MM 1 HND GYN DISP ENDOPCH

## (undated) DEVICE — RELOAD STPL SZ 0 L45MM DIA3.5MM 0DEG STD REG TISS BLU TI

## (undated) DEVICE — SOLUTION IV 500ML LAC RINGERS PH 6.5 INJ USP VIAFLX PLAS

## (undated) DEVICE — SOLUTION IV 1000ML 0.9% SOD CHL

## (undated) DEVICE — RELOAD STPL H1-2.5X45MM VASC THN TISS WHT 6 ROW B FRM SGL

## (undated) DEVICE — PUMP SUC IRR TBNG L10FT W/ HNDPC ASSEMB STRYKEFLOW 2

## (undated) DEVICE — STERILE POLYISOPRENE POWDER-FREE SURGICAL GLOVES: Brand: PROTEXIS

## (undated) DEVICE — ADHESIVE SKIN CLSR 0.7ML TOP DERMBND ADV

## (undated) DEVICE — SUTURE VCRL SZ 0 L27IN ABSRB VLT L36MM UR-5 5/8 CIR J376H

## (undated) DEVICE — GENERAL LAP CDS

## (undated) DEVICE — TROCAR ENDOSCP SHFT L100MM DIA5MM DIL TIP ENDOPATH XCEL